# Patient Record
Sex: FEMALE | NOT HISPANIC OR LATINO | URBAN - METROPOLITAN AREA
[De-identification: names, ages, dates, MRNs, and addresses within clinical notes are randomized per-mention and may not be internally consistent; named-entity substitution may affect disease eponyms.]

---

## 2020-09-03 ENCOUNTER — OFFICE VISIT (OUTPATIENT)
Dept: FAMILY MEDICINE CLINIC | Facility: CLINIC | Age: 80
End: 2020-09-03

## 2020-09-03 VITALS
HEART RATE: 98 BPM | TEMPERATURE: 97.6 F | DIASTOLIC BLOOD PRESSURE: 60 MMHG | OXYGEN SATURATION: 98 % | RESPIRATION RATE: 16 BRPM | SYSTOLIC BLOOD PRESSURE: 120 MMHG | BODY MASS INDEX: 25.03 KG/M2 | HEIGHT: 62 IN | WEIGHT: 136 LBS

## 2020-09-03 DIAGNOSIS — E11.9 TYPE 2 DIABETES MELLITUS WITHOUT COMPLICATION, WITHOUT LONG-TERM CURRENT USE OF INSULIN (HCC): Primary | ICD-10-CM

## 2020-09-03 PROCEDURE — 99203 OFFICE O/P NEW LOW 30 MIN: CPT | Performed by: NURSE PRACTITIONER

## 2020-09-03 NOTE — PROGRESS NOTES
Subjective   Catarina Sherwood is a 79 y.o. female.     Catarina Sherwood 79 y.o. female who presents today for a new patient appointment.    she has a history of   Patient Active Problem List   Diagnosis   • Diabetes mellitus (CMS/[facility])   .  she is here to re-establish care I reviewed the PFSH recorded today by my MA/LPN staff.   she   She has been feeling well.    She has not seen a PCP since 2014.  She has hx of Type 2 DM but does not take any medications and has not for years.  She states she controls this with diet and exercise.  She does not check her glucose at home and has not had labs in years.    Ate oatmeal at 8 am.        She denies hx of HTN or hyperlipidemia. BP good today.        She is having dental work done soon and was told to see a PMD first.  She denies any complaints today.        She has not had influenza, pneumonia, Tdap or shingles vaccine.      She has not had eye exam in a few years but states she will schedule one.   The following portions of the patient's history were reviewed and updated as appropriate: allergies, current medications, past family history, past medical history, past social history, past surgical history and problem list.    Review of Systems   Constitutional: Negative for fatigue and unexpected weight change.   Eyes: Negative for visual disturbance.   Respiratory: Negative for shortness of breath.    Cardiovascular: Negative for chest pain and palpitations.   Endocrine: Negative for cold intolerance, heat intolerance, polydipsia, polyphagia and polyuria.   Neurological: Negative for dizziness, light-headedness and headaches.   Psychiatric/Behavioral: Negative for behavioral problems.       Objective   Physical Exam   Constitutional: She is oriented to person, place, and time. She appears well-developed and well-nourished.   Neck: Carotid bruit is not present.   Cardiovascular: Normal rate and regular rhythm.   Pulmonary/Chest: Effort normal and breath sounds normal.    Neurological: She is alert and oriented to person, place, and time.   Psychiatric: She has a normal mood and affect. Her behavior is normal. Judgment and thought content normal.   Nursing note and vitals reviewed.      Assessment/Plan   Catarina was seen today for establish care.    Diagnoses and all orders for this visit:    Type 2 diabetes mellitus without complication, without long-term current use of insulin (CMS/[facility])  -     Comprehensive metabolic panel  -     Lipid panel  -     CBC and Differential  -     TSH  -     Hemoglobin A1c  -     MicroAlbumin, Urine, Random - Urine, Clean Catch             labs today.  She will get Tdap and Shingrix at pharmacy.   She will plan to get Fluzone and pneumovax here when Fluzone is in. She will let us know if she gets these at pharmacy.

## 2020-09-04 LAB
ALBUMIN SERPL-MCNC: 4.7 G/DL (ref 3.5–5.2)
ALBUMIN/GLOB SERPL: 2 G/DL
ALP SERPL-CCNC: 74 U/L (ref 39–117)
ALT SERPL-CCNC: 12 U/L (ref 1–33)
AST SERPL-CCNC: 14 U/L (ref 1–32)
BASOPHILS # BLD AUTO: 0.04 10*3/MM3 (ref 0–0.2)
BASOPHILS NFR BLD AUTO: 0.5 % (ref 0–1.5)
BILIRUB SERPL-MCNC: 0.5 MG/DL (ref 0–1.2)
BUN SERPL-MCNC: 12 MG/DL (ref 8–23)
BUN/CREAT SERPL: 16.7 (ref 7–25)
CALCIUM SERPL-MCNC: 10.1 MG/DL (ref 8.6–10.5)
CHLORIDE SERPL-SCNC: 96 MMOL/L (ref 98–107)
CHOLEST SERPL-MCNC: 251 MG/DL (ref 0–200)
CO2 SERPL-SCNC: 25 MMOL/L (ref 22–29)
CREAT SERPL-MCNC: 0.72 MG/DL (ref 0.57–1)
EOSINOPHIL # BLD AUTO: 0.04 10*3/MM3 (ref 0–0.4)
EOSINOPHIL NFR BLD AUTO: 0.5 % (ref 0.3–6.2)
ERYTHROCYTE [DISTWIDTH] IN BLOOD BY AUTOMATED COUNT: 12.6 % (ref 12.3–15.4)
GLOBULIN SER CALC-MCNC: 2.4 GM/DL
GLUCOSE SERPL-MCNC: 288 MG/DL (ref 65–99)
HBA1C MFR BLD: 11 % (ref 4.8–5.6)
HCT VFR BLD AUTO: 45 % (ref 34–46.6)
HDLC SERPL-MCNC: 52 MG/DL (ref 40–60)
HGB BLD-MCNC: 14.8 G/DL (ref 12–15.9)
IMM GRANULOCYTES # BLD AUTO: 0.02 10*3/MM3 (ref 0–0.05)
IMM GRANULOCYTES NFR BLD AUTO: 0.3 % (ref 0–0.5)
LDLC SERPL CALC-MCNC: 153 MG/DL (ref 0–100)
LYMPHOCYTES # BLD AUTO: 2.32 10*3/MM3 (ref 0.7–3.1)
LYMPHOCYTES NFR BLD AUTO: 30.1 % (ref 19.6–45.3)
MCH RBC QN AUTO: 29.8 PG (ref 26.6–33)
MCHC RBC AUTO-ENTMCNC: 32.9 G/DL (ref 31.5–35.7)
MCV RBC AUTO: 90.5 FL (ref 79–97)
MICROALBUMIN UR-MCNC: 11 UG/ML
MONOCYTES # BLD AUTO: 0.63 10*3/MM3 (ref 0.1–0.9)
MONOCYTES NFR BLD AUTO: 8.2 % (ref 5–12)
NEUTROPHILS # BLD AUTO: 4.65 10*3/MM3 (ref 1.7–7)
NEUTROPHILS NFR BLD AUTO: 60.4 % (ref 42.7–76)
NRBC BLD AUTO-RTO: 0 /100 WBC (ref 0–0.2)
PLATELET # BLD AUTO: 305 10*3/MM3 (ref 140–450)
POTASSIUM SERPL-SCNC: 4.5 MMOL/L (ref 3.5–5.2)
PROT SERPL-MCNC: 7.1 G/DL (ref 6–8.5)
RBC # BLD AUTO: 4.97 10*6/MM3 (ref 3.77–5.28)
SODIUM SERPL-SCNC: 133 MMOL/L (ref 136–145)
TRIGL SERPL-MCNC: 230 MG/DL (ref 0–150)
TSH SERPL DL<=0.005 MIU/L-ACNC: 0.99 UIU/ML (ref 0.27–4.2)
VLDLC SERPL CALC-MCNC: 46 MG/DL
WBC # BLD AUTO: 7.7 10*3/MM3 (ref 3.4–10.8)

## 2020-09-25 ENCOUNTER — OFFICE VISIT (OUTPATIENT)
Dept: FAMILY MEDICINE CLINIC | Facility: CLINIC | Age: 80
End: 2020-09-25

## 2020-09-25 VITALS
DIASTOLIC BLOOD PRESSURE: 80 MMHG | TEMPERATURE: 97.1 F | BODY MASS INDEX: 24.84 KG/M2 | HEIGHT: 62 IN | HEART RATE: 93 BPM | SYSTOLIC BLOOD PRESSURE: 126 MMHG | OXYGEN SATURATION: 98 % | RESPIRATION RATE: 16 BRPM | WEIGHT: 135 LBS

## 2020-09-25 DIAGNOSIS — E11.9 TYPE 2 DIABETES MELLITUS WITHOUT COMPLICATION, WITHOUT LONG-TERM CURRENT USE OF INSULIN (HCC): ICD-10-CM

## 2020-09-25 DIAGNOSIS — Z00.00 ANNUAL PHYSICAL EXAM: Primary | ICD-10-CM

## 2020-09-25 PROCEDURE — 99214 OFFICE O/P EST MOD 30 MIN: CPT | Performed by: FAMILY MEDICINE

## 2020-09-25 PROCEDURE — 93000 ELECTROCARDIOGRAM COMPLETE: CPT | Performed by: FAMILY MEDICINE

## 2020-09-25 RX ORDER — GLIMEPIRIDE 1 MG/1
1 TABLET ORAL
Qty: 30 TABLET | Refills: 2 | Status: SHIPPED | OUTPATIENT
Start: 2020-09-25 | End: 2020-12-31

## 2020-09-25 NOTE — PROGRESS NOTES
"Subjective   Chief Complaint:   Chief Complaint   Patient presents with   • Annual Exam         History of Present Illness Here for CPE. She is scheduled to have dental surgery per Dr. Bautista mendez. We reviewed her labs in detail: glucose 288, hemoglobin A1c 11. I am going to start her on Amaryl 1mg BID. I am also referring her to Diabetic Education at [facility]. I will recheck in two weeks, she is going to keep track of her sugars and bring them in. She is not yet cleared for surgery at this time. EKG is NSR with a rate of 88. No prior tracing for comparison. No chest pain, exertional dyspnea or palpitations.        Past Medical History:   Diagnosis Date   • Diabetes mellitus (CMS/HCC)         Catarina Sherwood 79 y.o. female who presents today for an Annual Physical Exam.      ICD-10-CM ICD-9-CM   1. Annual physical exam  Z00.00 V70.0   2. Type 2 diabetes mellitus without complication, without long-term current use of insulin (CMS/HCC)  E11.9 250.00        she has a problem list of   Patient Active Problem List   Diagnosis   • Diabetes mellitus (CMS/HCC)   .    she has been compliant with   Current Outpatient Medications:   •  glimepiride (Amaryl) 1 MG tablet, Take 1 tablet by mouth Every Morning Before Breakfast for 30 days., Disp: 30 tablet, Rfl: 2.  she denies medication side effects.        /80   Pulse 93   Temp 97.1 °F (36.2 °C)   Resp 16   Ht 157.5 cm (62\")   Wt 61.2 kg (135 lb)   SpO2 98%   BMI 24.69 kg/m²     Results for orders placed or performed in visit on 09/03/20   Comprehensive metabolic panel    Specimen: Blood   Result Value Ref Range    Glucose 288 (H) 65 - 99 mg/dL    BUN 12 8 - 23 mg/dL    Creatinine 0.72 0.57 - 1.00 mg/dL    eGFR Non African Am 79 >60 mL/min/1.73    eGFR African Am 95 >60 mL/min/1.73    BUN/Creatinine Ratio 16.7 7.0 - 25.0    Sodium 133 (L) 136 - 145 mmol/L    Potassium 4.5 3.5 - 5.2 mmol/L    Chloride 96 (L) 98 - 107 mmol/L    Total CO2 25.0 22.0 - 29.0 mmol/L    " Calcium 10.1 8.6 - 10.5 mg/dL    Total Protein 7.1 6.0 - 8.5 g/dL    Albumin 4.70 3.50 - 5.20 g/dL    Globulin 2.4 gm/dL    A/G Ratio 2.0 g/dL    Total Bilirubin 0.5 0.0 - 1.2 mg/dL    Alkaline Phosphatase 74 39 - 117 U/L    AST (SGOT) 14 1 - 32 U/L    ALT (SGPT) 12 1 - 33 U/L   Lipid panel    Specimen: Blood   Result Value Ref Range    Total Cholesterol 251 (H) 0 - 200 mg/dL    Triglycerides 230 (H) 0 - 150 mg/dL    HDL Cholesterol 52 40 - 60 mg/dL    VLDL Cholesterol [place] 46 mg/dL    LDL Chol Calc (NIH) 153 (H) 0 - 100 mg/dL   TSH    Specimen: Blood   Result Value Ref Range    TSH 0.991 0.270 - 4.200 uIU/mL   Hemoglobin A1c    Specimen: Blood   Result Value Ref Range    Hemoglobin A1C 11.00 (H) 4.80 - 5.60 %   MicroAlbumin, Urine, Random - Urine, Clean Catch    Specimen: Urine, Clean Catch   Result Value Ref Range    Microalbumin, Urine 11.0 Not Estab. ug/mL   CBC and Differential    Specimen: Blood   Result Value Ref Range    WBC 7.70 3.40 - 10.80 10*3/mm3    RBC 4.97 3.77 - 5.28 10*6/mm3    Hemoglobin 14.8 12.0 - 15.9 g/dL    Hematocrit 45.0 34.0 - 46.6 %    MCV 90.5 79.0 - 97.0 fL    MCH 29.8 26.6 - 33.0 pg    MCHC 32.9 31.5 - 35.7 g/dL    RDW 12.6 12.3 - 15.4 %    Platelets 305 140 - 450 10*3/mm3    Neutrophil Rel % 60.4 42.7 - 76.0 %    Lymphocyte Rel % 30.1 19.6 - 45.3 %    Monocyte Rel % 8.2 5.0 - 12.0 %    Eosinophil Rel % 0.5 0.3 - 6.2 %    Basophil Rel % 0.5 0.0 - 1.5 %    Neutrophils Absolute 4.65 1.70 - 7.00 10*3/mm3    Lymphocytes Absolute 2.32 0.70 - 3.10 10*3/mm3    Monocytes Absolute 0.63 0.10 - 0.90 10*3/mm3    Eosinophils Absolute 0.04 0.00 - 0.40 10*3/mm3    Basophils Absolute 0.04 0.00 - 0.20 10*3/mm3    Immature Granulocyte Rel % 0.3 0.0 - 0.5 %    Immature Grans Absolute 0.02 0.00 - 0.05 10*3/mm3    nRBC 0.0 0.0 - 0.2 /100 WBC       The following portions of the patient's history were reviewed and updated as appropriate: allergies, current medications, past family history, past medical  history, past social history, past surgical history and problem list.      she has a history of   Patient Active Problem List   Diagnosis   • Diabetes mellitus (CMS/[facility])       Review of Systems   Constitutional: Negative for activity change, appetite change and unexpected weight change.   HENT: Positive for dental problem.    Eyes: Negative for visual disturbance.   Respiratory: Negative for chest tightness and shortness of breath.    Cardiovascular: Negative for chest pain and palpitations.   Skin: Negative for color change.   Neurological: Negative for syncope and speech difficulty.   Psychiatric/Behavioral: Negative for confusion and decreased concentration.   All other systems reviewed and are negative.      Objective   Physical Exam  Vitals signs and nursing note reviewed.   Constitutional:       Appearance: She is well-developed.   HENT:      Head: Normocephalic and atraumatic.      Right Ear: External ear normal.      Left Ear: External ear normal.      Nose: Nose normal.      Mouth/Throat:      Mouth: Mucous membranes are moist.      Dentition: Abnormal dentition. Does not have dentures. Dental caries present. No dental abscesses.   Eyes:      Pupils: Pupils are equal, round, and reactive to light.   Cardiovascular:      Rate and Rhythm: Normal rate and regular rhythm.   Pulmonary:      Effort: Pulmonary effort is normal.      Breath sounds: Normal breath sounds.   Abdominal:      General: Bowel sounds are normal.      Palpations: Abdomen is soft.   Neurological:      Mental Status: She is alert and oriented to person, place, and time.   Psychiatric:         Behavior: Behavior normal.         Assessment/Plan   Catarina was seen today for annual exam.    Diagnoses and all orders for this visit:    Annual physical exam  -     ECG 12 Lead    Type 2 diabetes mellitus without complication, without long-term current use of insulin (CMS/[facility])  -     Ambulatory Referral to Diabetic Education    Other orders  -      glimepiride (Amaryl) 1 MG tablet; Take 1 tablet by mouth Every Morning Before Breakfast for 30 days.      Labs results discussed in detail with the patient, if no recent labs were done, order placed today.  Plan to update vaccines if needed today.  I  reviewed health maintenance with the patient as part of my preventative care plan. I discussed preventative counseling with the patient in detail.

## 2020-09-25 NOTE — PROGRESS NOTES
Procedure     ECG 12 Lead    Date/Time: 9/25/2020 10:18 AM  Performed by: Jake Diane MD  Authorized by: Jake Diane MD   Comparison: not compared with previous ECG   Previous ECG: no previous ECG available  Rhythm: sinus rhythm  Rate: normal  BPM: 87  ST Segments: ST segments normal  T Waves: T waves normal  QRS axis: normal  Other: no other findings    Clinical impression: normal ECG

## 2020-11-04 ENCOUNTER — OFFICE VISIT (OUTPATIENT)
Dept: FAMILY MEDICINE CLINIC | Facility: CLINIC | Age: 80
End: 2020-11-04

## 2020-11-04 VITALS
TEMPERATURE: 97.3 F | OXYGEN SATURATION: 99 % | DIASTOLIC BLOOD PRESSURE: 80 MMHG | WEIGHT: 137 LBS | RESPIRATION RATE: 16 BRPM | HEIGHT: 62 IN | SYSTOLIC BLOOD PRESSURE: 133 MMHG | HEART RATE: 92 BPM | BODY MASS INDEX: 25.21 KG/M2

## 2020-11-04 DIAGNOSIS — E11.65 TYPE 2 DIABETES MELLITUS WITH HYPERGLYCEMIA, WITHOUT LONG-TERM CURRENT USE OF INSULIN (HCC): Primary | ICD-10-CM

## 2020-11-04 PROCEDURE — 99214 OFFICE O/P EST MOD 30 MIN: CPT | Performed by: FAMILY MEDICINE

## 2020-11-04 NOTE — PROGRESS NOTES
Subjective   Chief Complaint:   Chief Complaint   Patient presents with   • Surgery clearance     pt is having 5 teeth pulled and she needs a note from doctor.          History of Present Illness comes in today with complaints of teeth problems and teeth broken off and some dental caries so she is seen a dentist and she is going to have oral surgery and have these teeth removed but she wants her the dentist wants a clearance from me for her to have the surgery but I looked at her labs in 2 months ago she had labs in September 2020 and her blood sugar was 288 and her cholesterol was 251 her hemoglobin was 14 8 and her hemoglobin A1c was 11 she saw Bren Lanza but she never saw the endocrinologist that Bren referred her to so she is needs to see Dr. Rodriguez before she has this dental procedure since her sugar is at high and I do not know what her sugars are at home she is not had a glucometer yet to check her sugars and I am to give her a prescription to get that.  So wanted to see Dr. Michael adair before any kind of surgical procedure and this was far January 2021 originally but I want her to see Dr. Michael adair somata put a hold on the surgery for her to see Dr. Michael adair she is not on any medicines for diabetes and she will need to see Dr. Rodriguez are can she can see Natalie adair severe tooth decay in her mouth and she needs to see the dentist to get this taken care of but she is got a see Dr. Minnie Graves or Natalie Martínez to get her blood sugar taken care of        Past Medical History:   Diagnosis Date   • Diabetes mellitus (CMS/[facility])         Catarina Sherwood 80 y.o. female who presents today for Medical Management of the below listed issues and medication refills.    ICD-10-CM ICD-9-CM   1. Type 2 diabetes mellitus with hyperglycemia, without long-term current use of insulin (CMS/HCC)  E11.65 250.00     790.29        she has a problem list of   Patient Active Problem List   Diagnosis   • Diabetes mellitus  "(CMS/[facility])   .    she has been compliant with   Current Outpatient Medications:   •  glimepiride (Amaryl) 1 MG tablet, Take 1 tablet by mouth Every Morning Before Breakfast for 30 days., Disp: 30 tablet, Rfl: 2.  she denies medication side effects.        /80   Pulse 92   Temp 97.3 °F (36.3 °C) (Skin)   Resp 16   Ht 157.5 cm (62\")   Wt 62.1 kg (137 lb)   SpO2 99%   BMI 25.06 kg/m²         The following portions of the patient's history were reviewed and updated as appropriate: allergies, current medications, past family history, past medical history, past social history, past surgical history and problem list.      she has a history of   Patient Active Problem List   Diagnosis   • Diabetes mellitus (CMS/[facility])       Review of Systems   Constitutional: Negative for activity change and fatigue.   HENT: Positive for mouth sores. Negative for nosebleeds.         Has several dental caries and broken off teeth and tooth decay and needs to see a dentist but she is got a see the endocrinologist first   Eyes: Negative for redness.   Respiratory: Negative for chest tightness.    Cardiovascular: Negative for chest pain.   Gastrointestinal: Negative for abdominal pain.   Genitourinary: Negative for difficulty urinating, frequency and urgency.   Musculoskeletal: Negative for back pain.   Neurological: Negative for facial asymmetry and headaches.   Psychiatric/Behavioral: Negative for confusion. The patient is not nervous/anxious.        Objective   Physical Exam  HENT:      Right Ear: Tympanic membrane and ear canal normal.      Left Ear: Tympanic membrane and ear canal normal.      Mouth/Throat:      Mouth: Mucous membranes are moist.      Pharynx: Oropharynx is clear.      Comments: Got several broken off teeth and several dental caries and needs this taken care of by dentist but she needs to see the endocrinologist or Natalie Martínez first  Eyes:      Pupils: Pupils are equal, round, and reactive to light.   Neck:     "  Musculoskeletal: Normal range of motion.   Cardiovascular:      Rate and Rhythm: Normal rate.   Pulmonary:      Effort: Pulmonary effort is normal.      Breath sounds: Normal breath sounds.   Abdominal:      General: Bowel sounds are normal.   Musculoskeletal: Normal range of motion.   Skin:     General: Skin is warm.      Findings: No rash.   Neurological:      General: No focal deficit present.      Mental Status: She is alert.   Psychiatric:         Mood and Affect: Mood normal.         Behavior: Behavior normal.         Thought Content: Thought content normal.         Judgment: Judgment normal.         Assessment/Plan   Diagnoses and all orders for this visit:    1. Type 2 diabetes mellitus with hyperglycemia, without long-term current use of insulin (CMS/[facility]) (Primary)  -     Continuous Blood Gluc  (FreeStyle Arpita Mora) device; 1 Device Daily. Check BS once daily DX E11.9  Dispense: 1 Device; Refill: 0      Labs results discussed in detail with the patient, if no recent labs were done, order placed today.  Plan to update vaccines if needed today.  I  reviewed health maintenance with the patient as part of my preventative care plan. I discussed preventative counseling with the patient in detail.

## 2020-11-05 RX ORDER — FLASH GLUCOSE SENSOR
1 KIT MISCELLANEOUS DAILY
Qty: 1 DEVICE | Refills: 0 | Status: SHIPPED | OUTPATIENT
Start: 2020-11-05 | End: 2021-05-13

## 2020-12-31 RX ORDER — GLIMEPIRIDE 1 MG/1
TABLET ORAL
Qty: 90 TABLET | Refills: 0 | Status: SHIPPED | OUTPATIENT
Start: 2020-12-31 | End: 2021-01-19 | Stop reason: SDUPTHER

## 2021-01-19 ENCOUNTER — OFFICE VISIT (OUTPATIENT)
Dept: ENDOCRINOLOGY | Age: 81
End: 2021-01-19

## 2021-01-19 VITALS
OXYGEN SATURATION: 96 % | DIASTOLIC BLOOD PRESSURE: 68 MMHG | SYSTOLIC BLOOD PRESSURE: 130 MMHG | BODY MASS INDEX: 25.1 KG/M2 | HEART RATE: 97 BPM | WEIGHT: 136.4 LBS | HEIGHT: 62 IN

## 2021-01-19 DIAGNOSIS — E78.2 HYPERLIPEMIA, MIXED: ICD-10-CM

## 2021-01-19 DIAGNOSIS — IMO0002 SEVERE UNCONTROLLED DIABETES MELLITUS: ICD-10-CM

## 2021-01-19 DIAGNOSIS — E11.65 TYPE 2 DIABETES MELLITUS WITH HYPERGLYCEMIA, WITHOUT LONG-TERM CURRENT USE OF INSULIN (HCC): Primary | Chronic | ICD-10-CM

## 2021-01-19 PROCEDURE — 99204 OFFICE O/P NEW MOD 45 MIN: CPT | Performed by: INTERNAL MEDICINE

## 2021-01-19 RX ORDER — FLASH GLUCOSE SCANNING READER
1 EACH MISCELLANEOUS 4 TIMES DAILY
Qty: 1 DEVICE | Refills: 0 | Status: SHIPPED | OUTPATIENT
Start: 2021-01-19 | End: 2021-05-13

## 2021-01-19 RX ORDER — FLASH GLUCOSE SENSOR
1 KIT MISCELLANEOUS AS NEEDED
Qty: 2 EACH | Refills: 3 | Status: SHIPPED | OUTPATIENT
Start: 2021-01-19 | End: 2021-05-13

## 2021-01-19 RX ORDER — LANCETS
EACH MISCELLANEOUS
Qty: 100 EACH | Refills: 2 | Status: SHIPPED | OUTPATIENT
Start: 2021-01-19

## 2021-01-19 RX ORDER — GLIMEPIRIDE 2 MG/1
2 TABLET ORAL
Qty: 180 TABLET | Refills: 3 | Status: SHIPPED | OUTPATIENT
Start: 2021-01-19 | End: 2021-05-13

## 2021-01-19 RX ORDER — BLOOD-GLUCOSE METER
EACH MISCELLANEOUS
Qty: 1 KIT | Refills: 0 | Status: SHIPPED | OUTPATIENT
Start: 2021-01-19

## 2021-01-19 RX ORDER — ROSUVASTATIN CALCIUM 5 MG/1
5 TABLET, COATED ORAL NIGHTLY
Qty: 30 TABLET | Refills: 11 | Status: SHIPPED | OUTPATIENT
Start: 2021-01-19 | End: 2021-05-13

## 2021-01-19 RX ORDER — GLIMEPIRIDE 1 MG/1
2 TABLET ORAL
Qty: 90 TABLET | Refills: 0 | Status: SHIPPED | OUTPATIENT
Start: 2021-01-19 | End: 2021-01-19

## 2021-01-19 RX ORDER — BLOOD SUGAR DIAGNOSTIC
STRIP MISCELLANEOUS
Qty: 100 EACH | Refills: 2 | Status: SHIPPED | OUTPATIENT
Start: 2021-01-19

## 2021-01-19 NOTE — PROGRESS NOTES
"Chief Complaint  Chief Complaint   Patient presents with   • Diabetes       NEW PATIENT/ TYPE 2 DM    Subjective            History of Present Illness  Catarina Sherwood,80 y.o. is here as a new patient  for the evaluation of type 2 dm. Consulted by .     Type 2 dm - Diagnosed in 2012  Today in clinic pt reports being on Glimepiride 1 mg po daily with break fast.  FBG - doesn't check  Pre meals - doesn't check  Checks BG - doesn't check  Dm retinopathy - no,Last eye exam - due for exam  Dm nephropathy - no  Dm neuropathy - no,Dm neuropathy meds - no  CAD -no  CVA -no  Episodes of hypoglycemia - none that she knows  Pt is physically active. weight has been stable.   Pt tries to follow DM diet for most part.   On Ace inb.    Reviewed primary care physician's/consulting physician documentation and lab results     I have reviewed the patient's allergies, medicines, past medical hx, family hx and social hx in detail.    Objective   Vital Signs:   /68   Pulse 97   Ht 157.5 cm (62\")   Wt 61.9 kg (136 lb 6.4 oz)   SpO2 96%   BMI 24.95 kg/m²     Physical Exam   General appearance - no distress  Eyes- anicteric sclera  Ear nose and throat-external ears and nose normal.    Respiratory-normal chest on inspection.  No respiratory distress noted.  Musculoskeletal-no edema.    Skin-no rashes.  Neuro-alert and oriented x3            Result Review :   The following data was reviewed by: Torres Rodriguez MD on 01/19/2021:  Office Visit on 09/03/2020   Component Date Value Ref Range Status   • Glucose 09/03/2020 288* 65 - 99 mg/dL Final   • BUN 09/03/2020 12  8 - 23 mg/dL Final   • Creatinine 09/03/2020 0.72  0.57 - 1.00 mg/dL Final   • eGFR Non  Am 09/03/2020 79  >60 mL/min/1.73 Final    Comment: The MDRD GFR formula is only valid for adults with stable  renal function between ages 18 and 70.     • eGFR  Am 09/03/2020 95  >60 mL/min/1.73 Final   • BUN/Creatinine Ratio 09/03/2020 16.7  7.0 - 25.0 Final "   • Sodium 09/03/2020 133* 136 - 145 mmol/L Final   • Potassium 09/03/2020 4.5  3.5 - 5.2 mmol/L Final   • Chloride 09/03/2020 96* 98 - 107 mmol/L Final   • Total CO2 09/03/2020 25.0  22.0 - 29.0 mmol/L Final   • Calcium 09/03/2020 10.1  8.6 - 10.5 mg/dL Final   • Total Protein 09/03/2020 7.1  6.0 - 8.5 g/dL Final   • Albumin 09/03/2020 4.70  3.50 - 5.20 g/dL Final   • Globulin 09/03/2020 2.4  gm/dL Final   • A/G Ratio 09/03/2020 2.0  g/dL Final   • Total Bilirubin 09/03/2020 0.5  0.0 - 1.2 mg/dL Final   • Alkaline Phosphatase 09/03/2020 74  39 - 117 U/L Final   • AST (SGOT) 09/03/2020 14  1 - 32 U/L Final   • ALT (SGPT) 09/03/2020 12  1 - 33 U/L Final   • Total Cholesterol 09/03/2020 251* 0 - 200 mg/dL Final   • Triglycerides 09/03/2020 230* 0 - 150 mg/dL Final   • HDL Cholesterol 09/03/2020 52  40 - 60 mg/dL Final   • VLDL Cholesterol [place] 09/03/2020 46  mg/dL Final   • LDL Chol Calc ([facility]) 09/03/2020 153* 0 - 100 mg/dL Final   • WBC 09/03/2020 7.70  3.40 - 10.80 10*3/mm3 Final   • RBC 09/03/2020 4.97  3.77 - 5.28 10*6/mm3 Final   • Hemoglobin 09/03/2020 14.8  12.0 - 15.9 g/dL Final   • Hematocrit 09/03/2020 45.0  34.0 - 46.6 % Final   • MCV 09/03/2020 90.5  79.0 - 97.0 fL Final   • MCH 09/03/2020 29.8  26.6 - 33.0 pg Final   • MCHC 09/03/2020 32.9  31.5 - 35.7 g/dL Final   • RDW 09/03/2020 12.6  12.3 - 15.4 % Final   • Platelets 09/03/2020 305  140 - 450 10*3/mm3 Final   • Neutrophil Rel % 09/03/2020 60.4  42.7 - 76.0 % Final   • Lymphocyte Rel % 09/03/2020 30.1  19.6 - 45.3 % Final   • Monocyte Rel % 09/03/2020 8.2  5.0 - 12.0 % Final   • Eosinophil Rel % 09/03/2020 0.5  0.3 - 6.2 % Final   • Basophil Rel % 09/03/2020 0.5  0.0 - 1.5 % Final   • Neutrophils Absolute 09/03/2020 4.65  1.70 - 7.00 10*3/mm3 Final   • Lymphocytes Absolute 09/03/2020 2.32  0.70 - 3.10 10*3/mm3 Final   • Monocytes Absolute 09/03/2020 0.63  0.10 - 0.90 10*3/mm3 Final   • Eosinophils Absolute 09/03/2020 0.04  0.00 - 0.40 10*3/mm3 Final   •  Basophils Absolute 09/03/2020 0.04  0.00 - 0.20 10*3/mm3 Final   • Immature Granulocyte Rel % 09/03/2020 0.3  0.0 - 0.5 % Final   • Immature Grans Absolute 09/03/2020 0.02  0.00 - 0.05 10*3/mm3 Final   • nRBC 09/03/2020 0.0  0.0 - 0.2 /100 WBC Final   • TSH 09/03/2020 0.991  0.270 - 4.200 uIU/mL Final   • Hemoglobin A1C 09/03/2020 11.00* 4.80 - 5.60 % Final    Comment: Hemoglobin A1C Ranges:  Increased Risk for Diabetes  5.7% to 6.4%  Diabetes                     >= 6.5%  Diabetic Goal                < 7.0%     • Microalbumin, Urine 09/03/2020 11.0  Not Estab. ug/mL Final     Data reviewed: PCP documentation        I reviewed the patient's new clinical results and mentioned them above in HPI and in plan as well.          Assessment and Plan    Problem List Items Addressed This Visit        Other    Diabetes mellitus (CMS/[facility]) - Primary    Relevant Medications    metFORMIN (Glucophage) 1000 MG tablet    glimepiride (AMARYL) 2 MG tablet    Other Relevant Orders    Hemoglobin A1c    Basic Metabolic Panel    Hemoglobin A1c    Basic Metabolic Panel    Lipid Panel    TSH    T4, Free    Microalbumin / Creatinine Urine Ratio - Urine, Clean Catch      Other Visit Diagnoses     Hyperlipemia, mixed        Relevant Medications    rosuvastatin (Crestor) 5 MG tablet    Other Relevant Orders    Hemoglobin A1c    Basic Metabolic Panel    Hemoglobin A1c    Basic Metabolic Panel    Lipid Panel    TSH    T4, Free    Microalbumin / Creatinine Urine Ratio - Urine, Clean Catch    Severe uncontrolled diabetes mellitus (CMS/[facility])        Relevant Medications    metFORMIN (Glucophage) 1000 MG tablet    glimepiride (AMARYL) 2 MG tablet    Other Relevant Orders    Hemoglobin A1c    Basic Metabolic Panel    Hemoglobin A1c    Basic Metabolic Panel    Lipid Panel    TSH    T4, Free    Microalbumin / Creatinine Urine Ratio - Urine, Clean Catch        Type 2 diabetes mellitus-severely uncontrolled  Patient is at high risk for diabetic complications  and hospitalizations  Start glimepiride 2 mg twice daily with meals  Start metformin 1000 mg twice daily    Hyperlipidemia  Start Crestor 5 mg oral daily.  Next para discussed the benefits and the side effects of the medication    We will place continuous glucose monitoring on the patient after we get the devices.  We will call the patient and let her know about it    I explained to the patient the importance of checking her blood sugars.      Follow Up   No follow-ups on file.    Refills/Meds sent to pharmacy    Interpreted the blood work-up/imaging results performed by the primary care/consulting physician -    Patient was given instructions and counseling regarding her condition or for health maintenance advice. Please see specific information pulled into the AVS if appropriate.

## 2021-01-20 LAB
BUN SERPL-MCNC: 10 MG/DL (ref 8–23)
BUN/CREAT SERPL: 15.4 (ref 7–25)
CALCIUM SERPL-MCNC: 10.8 MG/DL (ref 8.6–10.5)
CHLORIDE SERPL-SCNC: 101 MMOL/L (ref 98–107)
CO2 SERPL-SCNC: 30.1 MMOL/L (ref 22–29)
CREAT SERPL-MCNC: 0.65 MG/DL (ref 0.57–1)
GLUCOSE SERPL-MCNC: 130 MG/DL (ref 65–99)
HBA1C MFR BLD: 7.8 % (ref 4.8–5.6)
POTASSIUM SERPL-SCNC: 4.6 MMOL/L (ref 3.5–5.2)
SODIUM SERPL-SCNC: 140 MMOL/L (ref 136–145)

## 2021-01-21 RX ORDER — GLIMEPIRIDE 1 MG/1
TABLET ORAL
Qty: 360 TABLET | Refills: 1 | OUTPATIENT
Start: 2021-01-21

## 2021-04-30 LAB
ALBUMIN/CREAT UR: 44 MG/G CREAT (ref 0–29)
BUN SERPL-MCNC: 12 MG/DL (ref 8–23)
BUN/CREAT SERPL: 19.7 (ref 7–25)
CALCIUM SERPL-MCNC: 10.7 MG/DL (ref 8.6–10.5)
CHLORIDE SERPL-SCNC: 99 MMOL/L (ref 98–107)
CHOLEST SERPL-MCNC: 246 MG/DL (ref 0–200)
CO2 SERPL-SCNC: 31.9 MMOL/L (ref 22–29)
CREAT SERPL-MCNC: 0.61 MG/DL (ref 0.57–1)
CREAT UR-MCNC: 46.3 MG/DL
GLUCOSE SERPL-MCNC: 194 MG/DL (ref 65–99)
HBA1C MFR BLD: 9.2 % (ref 4.8–5.6)
HDLC SERPL-MCNC: 50 MG/DL (ref 40–60)
IMP & REVIEW OF LAB RESULTS: NORMAL
LDLC SERPL CALC-MCNC: 160 MG/DL (ref 0–100)
MICROALBUMIN UR-MCNC: 20.3 UG/ML
POTASSIUM SERPL-SCNC: 4.5 MMOL/L (ref 3.5–5.2)
SODIUM SERPL-SCNC: 139 MMOL/L (ref 136–145)
T4 FREE SERPL-MCNC: 1.23 NG/DL (ref 0.93–1.7)
TRIGL SERPL-MCNC: 196 MG/DL (ref 0–150)
TSH SERPL DL<=0.005 MIU/L-ACNC: 1.01 UIU/ML (ref 0.27–4.2)
VLDLC SERPL CALC-MCNC: 36 MG/DL (ref 5–40)

## 2021-05-13 ENCOUNTER — OFFICE VISIT (OUTPATIENT)
Dept: ENDOCRINOLOGY | Age: 81
End: 2021-05-13

## 2021-05-13 VITALS
OXYGEN SATURATION: 99 % | HEART RATE: 84 BPM | SYSTOLIC BLOOD PRESSURE: 156 MMHG | WEIGHT: 129.6 LBS | BODY MASS INDEX: 23.85 KG/M2 | HEIGHT: 62 IN | DIASTOLIC BLOOD PRESSURE: 76 MMHG

## 2021-05-13 DIAGNOSIS — E78.2 HYPERLIPEMIA, MIXED: ICD-10-CM

## 2021-05-13 DIAGNOSIS — E11.65 TYPE 2 DIABETES MELLITUS WITH HYPERGLYCEMIA, WITHOUT LONG-TERM CURRENT USE OF INSULIN (HCC): Primary | ICD-10-CM

## 2021-05-13 PROCEDURE — 99214 OFFICE O/P EST MOD 30 MIN: CPT | Performed by: INTERNAL MEDICINE

## 2021-05-13 RX ORDER — IBUPROFEN 600 MG/1
600 TABLET ORAL
COMMUNITY
Start: 2021-03-11

## 2021-05-13 RX ORDER — METFORMIN HYDROCHLORIDE 500 MG/1
500 TABLET, EXTENDED RELEASE ORAL 2 TIMES DAILY WITH MEALS
Qty: 60 TABLET | Refills: 11 | Status: SHIPPED | OUTPATIENT
Start: 2021-05-13 | End: 2022-12-23 | Stop reason: SDUPTHER

## 2021-05-13 RX ORDER — CHLORHEXIDINE GLUCONATE 0.12 MG/ML
RINSE ORAL
COMMUNITY
Start: 2021-03-11 | End: 2021-05-13

## 2021-05-13 RX ORDER — GLIMEPIRIDE 2 MG/1
2 TABLET ORAL 2 TIMES DAILY WITH MEALS
Qty: 30 TABLET | Refills: 11 | Status: SHIPPED | OUTPATIENT
Start: 2021-05-13 | End: 2022-07-19

## 2021-05-13 RX ORDER — DIPHENHYDRAMINE HCL 25 MG
25 CAPSULE ORAL NIGHTLY PRN
COMMUNITY
End: 2021-09-29

## 2021-05-13 RX ORDER — HYDROCODONE BITARTRATE AND ACETAMINOPHEN 5; 325 MG/1; MG/1
TABLET ORAL SEE ADMIN INSTRUCTIONS
COMMUNITY
Start: 2021-03-11 | End: 2021-05-13

## 2021-05-13 NOTE — PROGRESS NOTES
"Chief Complaint  Chief Complaint   Patient presents with   • Diabetes     pt not testing bs / last dm eye exam over 2 yrs ago        Subjective          History of Present Illness    Catarina Sherwood 80 y.o. presents with Type 2 dm as a F/u patient.     Type 2 dm - Diagnosed in 2012.  Today in clinic pt reports being on no medications, stopped all of them and is working on diet now.   FBG - doesn't check  Pre meals - doesn't check  Checks BG -doesn't check   Sensor - x   Dm retinopathy - x,Last eye exam - due for exam  Dm nephropathy - x  Dm neuropathy - yes ,Dm neuropathy meds - no  CAD - x  CVA -x  Episodes of hypoglycemia - none  Pt is physically active. weight has been stable.   Pt tries to follow DM diet for most part.         Reviewed primary care physician's/consulting physician documentation and lab results         I have reviewed the patient's allergies, medicines, past medical hx, family hx and social hx in detail.    Objective   Vital Signs:   /76 (BP Location: Right arm, Patient Position: Sitting, Cuff Size: Large Adult)   Pulse 84   Ht 157.5 cm (62.01\")   Wt 58.8 kg (129 lb 9.6 oz)   SpO2 99%   BMI 23.70 kg/m²   Physical Exam   General appearance - no distress  Eyes- anicteric sclera  Ear nose and throat-external ears and nose normal.    Respiratory-normal chest on inspection.  No respiratory distress noted.  Skin-no rashes.  Neuro-alert and oriented x3            Result Review :   The following data was reviewed by: Torres Rodriguez MD on 05/13/2021:  Orders Only on 04/29/2021   Component Date Value Ref Range Status   • Glucose 04/29/2021 194* 65 - 99 mg/dL Final   • BUN 04/29/2021 12  8 - 23 mg/dL Final   • Creatinine 04/29/2021 0.61  0.57 - 1.00 mg/dL Final   • eGFR Non African Am 04/29/2021 95  >60 mL/min/1.73 Final    Comment: The MDRD GFR formula is only valid for adults with stable  renal function between ages 18 and 70.     • eGFR  Am 04/29/2021 115  >60 mL/min/1.73 Final   • " BUN/Creatinine Ratio 04/29/2021 19.7  7.0 - 25.0 Final   • Sodium 04/29/2021 139  136 - 145 mmol/L Final   • Potassium 04/29/2021 4.5  3.5 - 5.2 mmol/L Final   • Chloride 04/29/2021 99  98 - 107 mmol/L Final   • Total CO2 04/29/2021 31.9* 22.0 - 29.0 mmol/L Final   • Calcium 04/29/2021 10.7* 8.6 - 10.5 mg/dL Final   • Total Cholesterol 04/29/2021 246* 0 - 200 mg/dL Final    Comment: Cholesterol Reference Ranges  (U.S. Department of Health and Human Services ATP III  Classifications)  Desirable          <200 mg/dL  Borderline High    200-239 mg/dL  High Risk          >240 mg/dL  Triglyceride Reference Ranges  (U.S. Department of Health and Human Services ATP III  Classifications)  Normal           <150 mg/dL  Borderline High  150-199 mg/dL  High             200-499 mg/dL  Very High        >500 mg/dL  HDL Reference Ranges  (U.S. Department of Health and Human Services ATP III  Classifcations)  Low     <40 mg/dl (major risk factor for CHD)  High    >60 mg/dl ('negative' risk factor for CHD)  LDL Reference Ranges  (U.S. Department of Health and Human Services ATP III  Classifcations)  Optimal          <100 mg/dL  Near Optimal     100-129 mg/dL  Borderline High  130-159 mg/dL  High             160-189 mg/dL  Very High        >189 mg/dL     • Triglycerides 04/29/2021 196* 0 - 150 mg/dL Final   • HDL Cholesterol 04/29/2021 50  40 - 60 mg/dL Final   • VLDL Cholesterol Tristian 04/29/2021 36  5 - 40 mg/dL Final   • LDL Chol Calc ([facility]) 04/29/2021 160* 0 - 100 mg/dL Final   • Creatinine, Urine 04/29/2021 46.3  Not Estab. mg/dL Final   • Microalbumin, Urine 04/29/2021 20.3  Not Estab. ug/mL Final   • Microalbumin/Creatinine Ratio 04/29/2021 44* 0 - 29 mg/g creat Final    Comment:                        Normal:                0 -  29                         Moderately increased: 30 - 300                         Severely increased:       >300     • Hemoglobin A1C 04/29/2021 9.20* 4.80 - 5.60 % Final    Comment: Hemoglobin A1C  Ranges:  Increased Risk for Diabetes  5.7% to 6.4%  Diabetes                     >= 6.5%  Diabetic Goal                < 7.0%     • Free T4 04/29/2021 1.23  0.93 - 1.70 ng/dL Final    Results may be falsely increased if patient taking Biotin.   • TSH 04/29/2021 1.010  0.270 - 4.200 uIU/mL Final   • Interpretation 04/29/2021 Note   Final    Supplemental report is available.     Data reviewed: PCP documentation       Results Review:    I reviewed the patient's new clinical results.     Assessment and Plan    Problem List Items Addressed This Visit        Other    Diabetes mellitus (CMS/[facility]) - Primary    Relevant Medications    metFORMIN ER (GLUCOPHAGE-XR) 500 MG 24 hr tablet    glimepiride (Amaryl) 2 MG tablet    Other Relevant Orders    Basic Metabolic Panel    Hemoglobin A1c    Lipid Panel    Microalbumin / Creatinine Urine Ratio - Urine, Clean Catch    TSH    T4, Free      Other Visit Diagnoses     Hyperlipemia, mixed        Relevant Orders    Basic Metabolic Panel    Hemoglobin A1c    Lipid Panel    Microalbumin / Creatinine Urine Ratio - Urine, Clean Catch    TSH    T4, Free        Type 2 diabetes mellitus-uncontrolled with hyperglycemia  Emphasized to the patient importance of compliance to her medications  Restart glimepiride 2 mg twice daily with meals  Start Metformin 500 mg twice daily with meals.  Discussed with the patient the risks of elevated blood sugars and diabetic complications.    Diabetic retinopathy screening  Refer the patient to ophthalmology    Discussed with the patient about dietary restrictions and exercise regimen to help improve the glycemic control.    Interpreted the blood work-up/imaging results performed by the primary care/consulting physician -    Refills sent to pharmacy    Follow Up     Patient was given instructions and counseling regarding her condition or for health maintenance advice. Please see specific information pulled into the AVS if appropriate.       Thank you for asking  "me to see your patient, Catarina Sherwood in consultation.         Torres Rodriguez MD  05/13/21      EMR Dragon / transcription disclaimer:     \"Dictated utilizing Dragon dictation\".         "

## 2021-05-13 NOTE — PATIENT INSTRUCTIONS
New medications for diabetes    Glimepiride 2 mg 2 times a day with food  Metformin 500 mg 2 times a day with food      After picking up the freestyle khanh for blood sugar monitoring please contact the office so that way we could screen you for the placement of the device

## 2021-08-30 ENCOUNTER — TELEPHONE (OUTPATIENT)
Dept: FAMILY MEDICINE CLINIC | Facility: CLINIC | Age: 81
End: 2021-08-30

## 2021-08-30 NOTE — TELEPHONE ENCOUNTER
Caller: [facility]    Relationship: skilled nursing    Best call back number:  [phone]    What form or medical record are you requesting: MEDICINE HISTORY    How would you like to receive the form or medical records (pick-up, mail, fax): FAX  If fax, what is the fax number: [phone]    Timeframe paperwork needed: ASAP    Additional notes: HEIDI FROM [facility] STATES THAT THE PATIENT WAS ADMITTED 08/27/2021 AND DID NOT HAVE VA LIST OF CURRENT MEDICATIONS. PRAFUL IS ASKING FOR A CURRENT MED LIST ONLY

## 2021-09-14 ENCOUNTER — TELEPHONE (OUTPATIENT)
Dept: FAMILY MEDICINE CLINIC | Facility: CLINIC | Age: 81
End: 2021-09-14

## 2021-09-14 NOTE — TELEPHONE ENCOUNTER
Caller: [facility]    Relationship: [facility]    Best call back number:     What orders are you requesting (i.e. lab or imaging): PHYSICAL THERAPY    In what timeframe would the patient need to come in:     Where will you receive your lab/imaging services:     Additional notes: PATIENT WILL BE RELEASED FROM A REHAB FACILITY AND [facility] NEEDS ORDERS FOR HER IN HOUSE PHYSICAL THERAPY. HE ALSO WANTS TO KNOW THE LAST TIME SHE WAS SEEN BY DR BRENNAN.

## 2021-09-14 NOTE — TELEPHONE ENCOUNTER
Please find out which order she needs for physical therapy; is this for home health; if so can they accept verbal orders?    Let them know last seen by Dr. Diane November 4 2020.

## 2021-09-29 ENCOUNTER — OFFICE VISIT (OUTPATIENT)
Dept: INTERNAL MEDICINE | Facility: CLINIC | Age: 81
End: 2021-09-29

## 2021-09-29 VITALS
DIASTOLIC BLOOD PRESSURE: 70 MMHG | HEIGHT: 62 IN | BODY MASS INDEX: 25.69 KG/M2 | TEMPERATURE: 97.7 F | OXYGEN SATURATION: 99 % | WEIGHT: 139.6 LBS | HEART RATE: 85 BPM | SYSTOLIC BLOOD PRESSURE: 122 MMHG

## 2021-09-29 DIAGNOSIS — Z76.89 ENCOUNTER TO ESTABLISH CARE: ICD-10-CM

## 2021-09-29 DIAGNOSIS — S12.101S CLOSED NONDISPLACED FRACTURE OF SECOND CERVICAL VERTEBRA, UNSPECIFIED FRACTURE MORPHOLOGY, SEQUELA: ICD-10-CM

## 2021-09-29 DIAGNOSIS — S82.892S CLOSED FRACTURE OF LEFT ANKLE, SEQUELA: ICD-10-CM

## 2021-09-29 DIAGNOSIS — S12.001S CLOSED NONDISPLACED FRACTURE OF FIRST CERVICAL VERTEBRA, UNSPECIFIED FRACTURE MORPHOLOGY, SEQUELA: ICD-10-CM

## 2021-09-29 DIAGNOSIS — E11.65 TYPE 2 DIABETES MELLITUS WITH HYPERGLYCEMIA, WITHOUT LONG-TERM CURRENT USE OF INSULIN (HCC): Primary | ICD-10-CM

## 2021-09-29 PROCEDURE — 99214 OFFICE O/P EST MOD 30 MIN: CPT | Performed by: FAMILY MEDICINE

## 2021-09-29 NOTE — PROGRESS NOTES
"Chief Complaint  Establish Care (new patient ), Diabetes, and Ankle Injury    Subjective    History of Present Illness {CC  Problem List  Visit  Diagnosis   Encounters  Notes  Medications  Labs  Result Review Imaging  Media :23}     Catarina Sherwood presents to [facility] for   History of Present Illness   79 yo female present to [facility] care.  Pt is new to me and this office.  She has a history of diabetes.  Pt states she fell down 12 step at the end of August and sustained ankle and cervical spine fractures.  Wearing neck brace and ankle boot currently.  Seen at [facility] with initial diagnosis, has not seen ortho.  was in rehab at [place] for a few weeks. Now living at [facility]. She is unsure how long she is to stay in the brace and cast.     Diabetes- states off all medication since 9/15/21.  Pt states son just bought her medicatiton to her yesterday. Taking medication previously as prescribed. Not check BS often.  Denies issues with low BS.  Follow in Endocrinology, last seen earlier this year.     Objective     Vital Signs:   /70 (BP Location: Left arm, Patient Position: Sitting, Cuff Size: Adult)   Pulse 85   Temp 97.7 °F (36.5 °C) (Temporal)   Ht 157.5 cm (62.01\")   Wt 63.3 kg (139 lb 9.6 oz)   SpO2 99%   BMI 25.53 kg/m²   Physical Exam  Vitals reviewed.   Constitutional:       General: She is not in acute distress.  HENT:      Head: Normocephalic.      Comments: Wearing neck brace     Right Ear: Tympanic membrane normal.      Left Ear: Tympanic membrane normal.   Eyes:      Conjunctiva/sclera: Conjunctivae normal.   Cardiovascular:      Rate and Rhythm: Regular rhythm.      Heart sounds: Normal heart sounds.   Pulmonary:      Effort: Pulmonary effort is normal. No respiratory distress.      Breath sounds: Normal breath sounds. No wheezing.   Abdominal:      General: Bowel sounds are normal. There is no distension.      Palpations: " Abdomen is soft.      Tenderness: There is no abdominal tenderness.   Musculoskeletal:      Right lower leg: No edema.      Comments: L foot in boot   Neurological:      Mental Status: She is alert.   Psychiatric:         Mood and Affect: Mood normal.        Result Review  Data Reviewed:{ Labs  Result Review  Imaging  Med Tab  Media :23}   The following data was reviewed by: Belle Denis MD on 09/29/2021  Lab Results - Last 18 Months   Lab Units 04/29/21  1504 01/19/21  1604 09/03/20  1156   GLUCOSE mg/dL 194* 130* 288*   BUN mg/dL 12 10 12   CREATININE mg/dL 0.61 0.65 0.72   EGFR IF NONAFRICN AM mL/min/1.73 95 88 79   EGFR IF AFRICN AM mL/min/1.73 115 107 95   SODIUM mmol/L 139 140 133*   POTASSIUM mmol/L 4.5 4.6 4.5   CHLORIDE mmol/L 99 101 96*   CALCIUM mg/dL 10.7* 10.8* 10.1   ALBUMIN g/dL  --   --  4.70   BILIRUBIN mg/dL  --   --  0.5   ALK PHOS U/L  --   --  74   AST (SGOT) U/L  --   --  14   ALT (SGPT) U/L  --   --  12   CHOLESTEROL mg/dL 246*  --  251*   TRIGLYCERIDES mg/dL 196*  --  230*   HDL CHOL mg/dL 50  --  52   VLDL CHOLESTEROL ABDIRIZAK mg/dL 36  --  46   LDL CHOL mg/dL 160*  --  153*   HEMOGLOBIN A1C % 9.20* 7.80* 11.00*   MICROALB UR ug/mL 20.3  --  11.0   WBC 10*3/mm3  --   --  7.70   RBC 10*6/mm3  --   --  4.97   HEMATOCRIT %  --   --  45.0   MCV fL  --   --  90.5   MCH pg  --   --  29.8   TSH uIU/mL 1.010  --  0.991   FREE T4 ng/dL 1.23  --   --      Reviewed ED notes and last office not Endocrinology          Assessment and Plan {CC Problem List  Visit Diagnosis  ROS  Review (Popup)  [facility] Maintenance  Quality  BestPractice  Medications  SmartSets  SnapShot Encounters  Media :23}   Problem List Items Addressed This Visit        Endocrine and Metabolic    Diabetes mellitus (CMS/HCC) - Primary    Current Assessment & Plan     Diabetes is Chronic, following with Endocrinology .   Continue current treatment regimen.  Reminded to bring in blood sugar diary at next visit.  Discussed  ways to avoid symptomatic hypoglycemia.  Discussed foot care.  Reminded to get yearly retinal exam.  continue current medication, she now has them from her son  Diabetes will be reassessed in 3 months.         Relevant Orders    Hemoglobin A1c    Comprehensive metabolic panel    Microalbumin / Creatinine Urine Ratio - Urine, Clean Catch      Other Visit Diagnoses     Encounter to establish care        Closed nondisplaced fracture of first cervical vertebra, unspecified fracture morphology, sequela        Relevant Orders    Ambulatory Referral to Orthopedic Surgery    Closed nondisplaced fracture of second cervical vertebra, unspecified fracture morphology, sequela        Relevant Orders    Ambulatory Referral to Orthopedic Surgery    Closed fracture of left ankle, sequela        Relevant Orders    Ambulatory Referral to Orthopedic Surgery              Follow Up   Return in about 3 months (around 12/29/2021).  Patient was given instructions and counseling regarding her condition or for health maintenance advice. Please see specific information pulled into the AVS if appropriate.    EpicAct:[email or web address]_[email or web address],RunParams:STARTUPTYPE=FOLLOW    [email or web address]

## 2021-09-29 NOTE — ASSESSMENT & PLAN NOTE
Diabetes is Chronic, following with Endocrinology .   Continue current treatment regimen.  Reminded to bring in blood sugar diary at next visit.  Discussed ways to avoid symptomatic hypoglycemia.  Discussed foot care.  Reminded to get yearly retinal exam.  continue current medication, she now has them from her son  Diabetes will be reassessed in 3 months.

## 2021-09-30 LAB
ALBUMIN SERPL-MCNC: 4.4 G/DL (ref 3.5–5.2)
ALBUMIN/CREAT UR: 51 MG/G CREAT (ref 0–29)
ALBUMIN/GLOB SERPL: 1.7 G/DL
ALP SERPL-CCNC: 108 U/L (ref 39–117)
ALT SERPL-CCNC: 14 U/L (ref 1–33)
AST SERPL-CCNC: 18 U/L (ref 1–32)
BILIRUB SERPL-MCNC: 0.2 MG/DL (ref 0–1.2)
BUN SERPL-MCNC: 13 MG/DL (ref 8–23)
BUN/CREAT SERPL: 20.6 (ref 7–25)
CALCIUM SERPL-MCNC: 10.3 MG/DL (ref 8.6–10.5)
CHLORIDE SERPL-SCNC: 99 MMOL/L (ref 98–107)
CO2 SERPL-SCNC: 29.9 MMOL/L (ref 22–29)
CREAT SERPL-MCNC: 0.63 MG/DL (ref 0.57–1)
CREAT UR-MCNC: 40 MG/DL
GLOBULIN SER CALC-MCNC: 2.6 GM/DL
GLUCOSE SERPL-MCNC: 200 MG/DL (ref 65–99)
HBA1C MFR BLD: 7.5 % (ref 4.8–5.6)
MICROALBUMIN UR-MCNC: 20.2 UG/ML
POTASSIUM SERPL-SCNC: 4.9 MMOL/L (ref 3.5–5.2)
PROT SERPL-MCNC: 7 G/DL (ref 6–8.5)
SODIUM SERPL-SCNC: 136 MMOL/L (ref 136–145)

## 2021-10-06 ENCOUNTER — OFFICE VISIT (OUTPATIENT)
Dept: ORTHOPEDIC SURGERY | Facility: CLINIC | Age: 81
End: 2021-10-06

## 2021-10-06 VITALS — WEIGHT: 139 LBS | TEMPERATURE: 97.2 F | BODY MASS INDEX: 25.58 KG/M2 | HEIGHT: 62 IN

## 2021-10-06 DIAGNOSIS — S12.091A OTHER CLOSED NONDISPLACED FRACTURE OF FIRST CERVICAL VERTEBRA, INITIAL ENCOUNTER (HCC): ICD-10-CM

## 2021-10-06 DIAGNOSIS — M25.572 LEFT ANKLE PAIN, UNSPECIFIED CHRONICITY: Primary | ICD-10-CM

## 2021-10-06 DIAGNOSIS — S82.822A CLOSED TORUS FRACTURE OF DISTAL END OF LEFT FIBULA, INITIAL ENCOUNTER: ICD-10-CM

## 2021-10-06 DIAGNOSIS — S12.191A OTHER CLOSED NONDISPLACED FRACTURE OF SECOND CERVICAL VERTEBRA, INITIAL ENCOUNTER (HCC): ICD-10-CM
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
## 2021-10-06 PROCEDURE — 99213 OFFICE O/P EST LOW 20 MIN: CPT | Performed by: NURSE PRACTITIONER

## 2021-10-06 PROCEDURE — 73610 X-RAY EXAM OF ANKLE: CPT | Performed by: NURSE PRACTITIONER

## 2021-10-06 PROCEDURE — 72050 X-RAY EXAM NECK SPINE 4/5VWS: CPT | Performed by: NURSE PRACTITIONER

## 2021-10-06 RX ORDER — ROSUVASTATIN CALCIUM 5 MG/1
5 TABLET, COATED ORAL DAILY
COMMUNITY
End: 2022-02-03 | Stop reason: SDUPTHER

## 2021-10-06 NOTE — PATIENT INSTRUCTIONS
Stable Cervical Spine Fracture    A cervical spine fracture is a break or crack in one of the seven bones (vertebrae) that make up the spine of the neck. These bones hold up the head and protect the spinal cord. The spinal cord runs through the center of the vertebrae. The fracture is considered stable if there is a very low risk of problems happening during healing, and if the bones have not moved out of position.  What are the causes?  This condition may be caused by:  · Motor vehicle accidents.  · Injuries from sports such as diving, football, biking, wrestling, or skiing.  · Severe osteoporosis or other bone diseases. These may include:  ? Cancers that spread to the bone.  ? Metabolic abnormalities that cause bone weakness.  What are the signs or symptoms?  Symptoms of this condition include:  · Severe neck pain after an accident or fall. Pain may spread (radiate) down the shoulders or arms.  · Bruising or swelling on the back of the neck.  · Numbness, tingling, sudden muscle tightening (spasms), or weakness in the arms or legs or both.  How is this diagnosed?  This condition may be diagnosed based on:  · Your medical history.  · A physical exam of your neck, arms, and legs.  · Imaging studies of your neck, such as X-ray, CT scan, or MRI.  How is this treated?  This condition is treated with:  · A device that supports your chin and the back of your head (neck brace or cervical collar). This will keep your neck from moving during the healing process.  · Medicine to help relieve pain, if needed.  Follow these instructions at home:  If you have a neck brace or cervical collar:  · Wear the brace or collar as told by your health care provider. Do not remove it unless told by your health care provider.  · If you are allowed to remove your collar or brace for cleaning and bathing:  ? Follow your health care provider's instructions about how to safely take off the collar.  ? Wash and thoroughly dry the skin on your  neck. Check your skin for irritation or sores. If you see any, tell your health care provider.  ? Keep your collar or brace clean by wiping it with mild soap and water and letting it air-dry completely. The pads can be hand-washed with soap and water and air-dried completely.  · Ask your health care provider before making any adjustments to your collar or brace. Small adjustments may be needed over time to improve comfort and reduce pressure on your chin or on the back of your head.  · If your brace or collar is not waterproof:  ? Do not let it get wet.  ? Cover it with a watertight covering when you take a bath or a shower.  Managing pain, stiffness, and swelling  If directed, put ice on the injured area. To do this:  · If you have a removable brace or cervical collar, remove it as told by your health care provider.  · Put ice in a plastic bag.  · Place a towel between your skin and the bag.  · Leave the ice on for 20 minutes, 2-3 times a day.    Activity  · Ask your health care provider when it is safe to drive if you have a brace or collar on your neck.  · Ask your health care provider if the medicine prescribed to you requires you to avoid driving or using machinery.  · Do not lift anything that is heavier than 10 lb (4.5 kg), or the limit that you are told, until your health care provider says that it is safe.  · Return to your normal activities as told by your health care provider. Ask your health care provider what activities are safe for you.  General instructions  · Take over-the-counter and prescription medicines only as told by your health care provider.  · Do not take baths, swim, or use a hot tub until your health care provider approves. Ask your health care provider if you can take showers. You may only be allowed to take sponge baths.  · Do not use any products that contain nicotine or tobacco, such as cigarettes, e-cigarettes, and chewing tobacco. These can delay bone healing. If you need help  quitting, ask your health care provider.  · Your medicines may cause constipation. To prevent or treat constipation, you may need to:  ? Drink enough fluid to keep your urine pale yellow.  ? Take over-the-counter or prescription medicines.  ? Eat foods that are high in fiber, such as beans, whole grains, and fresh fruits and vegetables.  ? Limit foods that are high in fat and processed sugars, such as fried or sweet foods.  · Keep all follow-up visits as told by your health care provider. This is important. Follow-up visits will help prevent long-term (chronic) or permanent injury, pain, and disability. You may need follow-up X-rays or MRI 1-3 weeks after your injury.  Contact a health care provider if:  · You have irritation or sores on your skin from your brace or cervical collar.  Get help right away if:  · You have neck pain that gets worse.  · You develop difficulties swallowing or breathing.  · You develop swelling in your neck.  · You have any of the following problems in your arms or legs or both:  ? Numbness.  ? Weakness.  ? Burning pain.  ? Movement problems.  · You are unable to control when you urinate or have a bowel movement (incontinence).  · You have problems with coordination or difficulty walking.  These symptoms may represent a serious problem that is an emergency. Do not wait to see if the symptoms will go away. Get medical help right away. Call your local emergency services (911 in the [place] Do not drive yourself to the hospital.  Summary  · A cervical spine fracture is a break or crack in one of the seven bones (vertebrae) that make up the spine of the neck.  · Your fracture is considered stable if there is a very low risk of problems happening during healing.  · A fracture is treated with a device to support your neck and with pain medicine, if needed.  This information is not intended to replace advice given to you by your health care provider. Make sure you discuss any questions you have  with your health care provider.  Document Revised: 11/19/2020 Document Reviewed: 11/19/2020  Elsevier Patient Education © 2021 [employer]  Ankle Exercises  Ask your health care provider which exercises are safe for you. Do exercises exactly as told by your health care provider and adjust them as directed. It is normal to feel mild stretching, pulling, tightness, or mild discomfort as you do these exercises. Stop right away if you feel sudden pain or your pain gets worse. Do not begin these exercises until told by your health care provider.  Stretching and range-of-motion exercises  These exercises warm up your muscles and joints and improve the movement and flexibility of your ankle. These exercises may also help to relieve pain.  Dorsiflexion/plantar flexion    1. Sit with your __________ knee straight or bent. Do not rest your foot on anything.  2. Flex your __________ ankle to tilt the top of your foot toward your shin. This is called dorsiflexion.  3. Hold this position for __________ seconds.  4. Point your toes downward to tilt the top of your foot away from your shin. This is called plantar flexion.  5. Hold this position for __________ seconds.  Repeat __________ times. Complete this exercise __________ times a day.  Ankle alphabet    1. Sit with your __________ foot supported at your lower leg.  ? Do not rest your foot on anything.  ? Make sure your foot has room to move freely.  2. Think of your __________ foot as a paintbrush:  ? Move your foot to trace each letter of the alphabet in the air. Keep your hip and knee still while you trace the letters. Trace every letter from A to Z.  ? Make the letters as large as you can without causing or increasing any discomfort.  Repeat __________ times. Complete this exercise __________ times a day.  Passive ankle dorsiflexion  This is an exercise in which something or someone moves your ankle for you. You do not move it yourself.  1. Sit on a chair that is placed  on a non-carpeted surface.  2. Place your __________ foot on the floor, directly under your __________ knee. Extend your __________ leg for support.  3. Keeping your heel down, slide your __________ foot back toward the chair until you feel a stretch at your ankle or calf. If you do not feel a stretch, slide your buttocks forward to the edge of the chair while keeping your heel down.  4. Hold this stretch for __________ seconds.  Repeat __________ times. Complete this exercise __________ times a day.  Strengthening exercises  These exercises build strength and endurance in your ankle. Endurance is the ability to use your muscles for a long time, even after they get tired.  Dorsiflexors  These are muscles that lift your foot up.  1. Secure a rubber exercise band or tube to an object, such as a table leg, that will stay still when the band is pulled. Secure the other end around your __________ foot.  2. Sit on the floor, facing the object with your __________ leg extended. The band or tube should be slightly tense when your foot is relaxed.  3. Slowly flex your __________ ankle and toes to bring your foot toward your shin.  4. Hold this position for __________ seconds.  5. Slowly return your foot to the starting position, controlling the band as you do that.  Repeat __________ times. Complete this exercise __________ times a day.  Plantar flexors  These are muscles that push your foot down.  1. Sit on the floor with your __________ leg extended.  2. Loop a rubber exercise band or tube around the ball of your __________ foot. The ball of your foot is on the walking surface, right under your toes. The band or tube should be slightly tense when your foot is relaxed.  3. Slowly point your toes downward, pushing them away from you.  4. Hold this position for __________ seconds.  5. Slowly release the tension in the band or tube, controlling smoothly until your foot is back in the starting position.  Repeat __________  times. Complete this exercise __________ times a day.  Towel curls    1. Sit in a chair on a non-carpeted surface, and put your feet on the floor.  2. Place a towel in front of your feet. If told by your health care provider, add a __________ pound weight to the end of the towel.  3. Keeping your heel on the floor, put your __________ foot on the towel.  4. Pull the towel toward you by grabbing the towel with your toes and curling them under. Keep your heel on the floor.  5. Let your toes relax.  6. Grab the towel again. Keep pulling the towel until it is completely underneath your foot.  Repeat __________ times. Complete this exercise __________ times a day.  Standing plantar flexion  This is an exercise in which you use your toes to lift your body's weight while standing.  1. Stand with your feet shoulder-width apart.  2. Keep your weight spread evenly over the width of your feet while you rise up on your toes. Use a wall or table to steady yourself if needed, but try not to use it for support.  3. If this exercise is too easy, try these options:  ? Shift your weight toward your __________ leg until you feel challenged.  ? If told by your health care provider, lift your uninjured leg off the floor.  4. Hold this position for __________ seconds.  Repeat __________ times. Complete this exercise __________ times a day.  Tandem walking  1. Stand with one foot directly in front of the other.  2. Slowly raise your back foot up, lifting your heel before your toes, and place it directly in front of your other foot.  3. Continue to walk in this heel-to-toe way for __________ or for as long as told by your health care provider. Have a countertop or wall nearby to use if needed to keep your balance, but try not to hold onto anything for support.  Repeat __________ times. Complete this exercise __________ times a day.  This information is not intended to replace advice given to you by your health care provider. Make sure you  discuss any questions you have with your health care provider.  Document Revised: 09/14/2019 Document Reviewed: 09/16/2019  Elsevier Patient Education © 2021 [employer]

## 2021-10-06 NOTE — PROGRESS NOTES
Patient Name: Catarina Sherwood   YOB: 1940  Referring Primary Care Physician: Belle Denis MD  BMI: Body mass index is 25.42 kg/m².    Chief Complaint:    Chief Complaint   Patient presents with   • Left Ankle - Pain        HPI: New pt to me referred by PCP. Pt is in rehab after a fall down 12 steps 8-22-21.She was seen at  and [facility].  She is in a neck brace and cam boot/plaster cast. Here today for a follow up for her ankle and neck referred by PCP. Pt is ambulating with a cane. She was seen 9-29- 21 by her PCP.  Pt is confused about her follow up. She denies pain in neck. Chart is reviewed in Care Everywhere.     Catarina Sherwood is a 81 y.o. female who presents today for evaluation of   Chief Complaint   Patient presents with   • Left Ankle - Pain         Subjective   Medications:   Home Medications:  Current Outpatient Medications on File Prior to Visit   Medication Sig   • Blood Glucose Monitoring Suppl (Accu-Chek Mary Ann Plus) w/Device kit 1 device   • Continuous Blood Gluc  (FreeStyle Arpita 2 Sharon) device 1 Device Daily.   • Continuous Blood Gluc Sensor (FreeStyle Arpita 2 Sensor) misc 1 Device Every 14 (Fourteen) Days.   • glimepiride (Amaryl) 2 MG tablet Take 1 tablet by mouth 2 (Two) Times a Day With Meals.   • glucose blood (Accu-Chek Mary Ann Plus) test strip To check 3 - 4 times a day   • Lancets (accu-chek multiclix) lancets To check 3 - 4 times a day   • metFORMIN ER (GLUCOPHAGE-XR) 500 MG 24 hr tablet Take 1 tablet by mouth 2 (Two) Times a Day With Meals.   • rosuvastatin (CRESTOR) 5 MG tablet Take 5 mg by mouth Daily.   • ibuprofen (ADVIL,MOTRIN) 600 MG tablet Take 600 mg by mouth. 2 before bed     No current facility-administered medications on file prior to visit.     Current Medications:  Scheduled Meds:  Continuous Infusions:No current facility-administered medications for this visit.    PRN Meds:.    I have reviewed the patient's medical history in detail and updated the  "computerized patient record.  Review and summarization of old records includes:    Past Medical History:   Diagnosis Date   • Diabetes mellitus (HCC)         Past Surgical History:   Procedure Laterality Date   • CHOLECYSTECTOMY          Social History     Occupational History   • Not on file   Tobacco Use   • Smoking status: Never Smoker   • Smokeless tobacco: Never Used   Substance and Sexual Activity   • Alcohol use: Never   • Drug use: Never   • Sexual activity: Defer      Social History     Social History Narrative   • Not on file        Family History   Problem Relation Age of Onset   • Cancer Mother    • Diabetes Other        ROS: 14 point review of systems was performed and all other systems were reviewed and are negative except for documented findings in HPI and today's encounter.     Allergies: No Known Allergies  Constitutional:  Denies fever, shaking or chills   Eyes:  Denies change in visual acuity   HENT:  Denies nasal congestion or sore throat   Respiratory:  Denies cough or shortness of breath   Cardiovascular:  Denies chest pain or severe LE edema   GI:  Denies abdominal pain, nausea, vomiting, bloody stools or diarrhea   Musculoskeletal:  Numbness, tingling, pain, or loss of motor function only as noted above in history of present illness.  : Denies painful urination or hematuria  Integument:  Denies rash, lesion or ulceration   Neurologic:  Denies headache or focal weakness  Endocrine:  Denies lymphadenopathy  Psych:  Denies confusion or change in mental status   Hem:  Denies active bleeding    OBJECTIVE:  Physical Exam: 81 y.o. female  Wt Readings from Last 3 Encounters:   10/06/21 63 kg (139 lb)   09/29/21 63.3 kg (139 lb 9.6 oz)   08/25/21 62.1 kg (137 lb)     Ht Readings from Last 1 Encounters:   10/06/21 157.5 cm (62\")     Body mass index is 25.42 kg/m².  Vitals:    10/06/21 1332   Temp: 97.2 °F (36.2 °C)     Vital signs reviewed.     General Appearance:    Alert, cooperative, in no acute " distress                  Eyes: conjunctiva clear  ENT: external ears and nose atraumatic  CV: no peripheral edema  Resp: normal respiratory effort  Skin: no rashes or wounds; normal turgor  Psych: mood and affect appropriate  Lymph: no nodes appreciated  Neuro: gross sensation intact  Vascular:  Palpable peripheral pulse in noted extremity  Musculoskeletal Extremities: hard cervical collar on pt, fitting loose, short cam boot with a plaster stirrup on that was removed skin inspected to the left ankle warm dry and intact no lymphadenopathy compartments are soft she has some tenderness over the distal fibula calf is soft knee is nontender base the fifth metatarsal is nontender.  Cervical collar was removed she has diffuse tenderness to the C-spine no point tenderness soft collar was placed.  Equal grasp to bilateral upper extremity she is ambulating with a cane has sensation is intact capillary refill and distal pulses and sensation is intact    Radiology: Left ankle 3 views done today show a plaster cast stirrup and a healing spiral torus fracture of the distal fibula in good alignment no comparison films readily available.  Complete C-spine done under the direction of Dr. Callahan and reviewed by Dr. Callahan open-mouth AP flexion extension lateral for cervical fracture show healing C2 facet nondisplaced fracture no instability. CT showed more and xrays reviewed by Dr Callahan.    ACCESSION NUMBER: [id number]     DATE: 08/26/2021 1:02 PM     EXAMINATION: CR Ankle 1 Vw LT     PROVIDED INDICATION: STRESS view external     COMPARISON: Left ankle images 08/26/2012 1 1230 hours     TECHNIQUE: Portable stress view of the left ankle.     FINDINGS: Lucency is again seen through the distal fibula. No change in alignment is seen. Mortise joint remains intact.     IMPRESSION: No change in appearance of question fracture of the distal fibula post stress view.     Signed by Kingsley Ham M.D. on 8/26/2021 1:06 PM        RADIOLOGY REPORT     FACILITY:  [facility]   UNIT/AGE/GENDER: MARY.ED  ER      AGE:80 Y          SEX:F   PATIENT NAME/:  VENKAT OATES TABOR    1940   UNIT NUMBER:  [id number]   ACCOUNT NUMBER:  [id number]   ACCESSION NUMBER:  [id number]     CT OF THE CERVICAL SPINE WITHOUT CONTRAST     DATE: 2021     COMPARISON: None available     INDICATION: Neck pain after fall. Pain at base of skull..         FINDINGS: Axial, coronal, and sagittal images of the cervical spine were obtained.  Radiation dose reduction techniques were utilized per ALARA protocol.     Best demonstrated on coronal images 21-24 and axial series 4 images 76-85, there are vertically oriented fractures of the right and left aspects of the C2 vertebral body. The right C2 vertebral body extends into the lateral mass and involves the right   transverse foramen and the right superior articular process at the right C1-C2 joint. Fracture at the left aspect of the C2 vertebral body is nondisplaced. There is no significant subluxation at C1-C2.     As demonstrated on axial series 4 image 70, there are small comminuted fractures of the medial aspect of the right lateral mass of C1, with involvement of the right C1-C2 articulation. No significant displacement at these fractures. Possible subtle   nondisplaced fracture of the posterior cortex of the posterior arch of C1 just left of midline on series 4 image 65.     No abnormal malalignment at the craniocervical junction. No fractures of the occipital condyles.     No other fractures of the cervical spine. 2 mm grade 1 degenerative anterolisthesis of C4 on C5. No facet subluxation is. Multilevel severe facet arthropathy. Severe degenerative disease at C5-C6. Other multilevel mild degenerative disease. Multilevel   moderate central canal stenosis due to posterior disc osteophyte complexes. Multilevel bilateral neural foraminal narrowing. No prevertebral soft tissue swelling.  Age-indeterminate soft tissue swelling posterior to the odontoid process.     IMPRESSION:   1. Acute, nondisplaced fractures of the right and left aspects of the C2 vertebral body with the right sided fracture involving the right transverse foramen and the superior articular process. There is no associated C1-C2 subluxation. If further   evaluation of vertebral artery injury is required, CT arteriogram of the neck would be helpful.     2. Small comminuted fractures at the medial aspect of the right lateral mass of C1, involving the inferior articular facet. No significant displacement of these fractures. There may be a subtle nondisplaced cortical fracture of the left posterior arch of    C1 versus a vascular foramen.     3. No other acute fractures of the cervical spine.     4. Multilevel degenerative spondylosis of the cervical spine.     Dictated by: Manoj Yo M.D.     Images and Report reviewed and interpreted by: Manoj Yo M.D.     <PS><Electronically signed by: Manoj Yo M.D.>   08/25/2021 2236     D: 08/25/2021 2224   T: 08/25/2021 2224          Assessment:     ICD-10-CM ICD-9-CM   1. Left ankle pain, unspecified chronicity  M25.572 719.47   2. Other closed nondisplaced fracture of second cervical vertebra, initial encounter ([facility])  S12.191A 805.02   3. Fall from slip, trip, or stumble, initial encounter  W01.0XXA E885.9   4. Closed torus fracture of distal end of left fibula, initial encounter  S82.822A 823.41   5. Other closed nondisplaced fracture of first cervical vertebra, initial encounter ([facility])  S12.091A 805.01        MDM/Plan:   The diagnosis(es), natural history, pathophysiology and treatment for diagnosis(es) were discussed. Opportunity given and questions answered.  Biomechanics of pertinent body areas discussed.  When appropriate, the use of ambulatory aids discussed.    The diagnosis(es), natural history, pathophysiology and treatment for diagnosis(es) were discussed.  Opportunity given and questions answered.  Biomechanics of pertinent body areas discussed.  When appropriate, the use of ambulatory aids discussed.  EXERCISES:  Advice on benefits of, and types of regular/moderate exercise pertaining to orthopedic diagnosis(es).  MEDICATIONS:  The risks, benefits, warnings,side effects and alternatives of medications discussed.  Inflammation/pain control; with cold, heat, elevation and/or liniments discussed as appropriate  PT referral.  HOME EXERCISE/PT program encouraged  SPECIALTY REFERRAL  CONSULT: This Consult is done at the request of a requesting provider to whom I will send this report with this rendered opinion.    Pt is in a plaster cast stirrup and cam walking boot - plaster cast removed in exam room skin intact - she can wean out of the boot and start ROM and PT WBAT she has been in this hard cast and has some tenderness over the fibula that remains and I think from being immobilized for so long we will need to get her moving this was explained to the patient in will place an order for physical therapy  I consulted with Dr. Callahan and did a C-spine complete he interpreted fractures were healed in place and is soft collar and can follow-up with Dr. Callahan  10/6/2021    Much of this encounter note is an electronic transcription/translation of spoken language to printed text. The electronic translation of spoken language may permit erroneous, or at times, nonsensical words or phrases to be inadvertently transcribed; Although I have reviewed the note for such errors, some may still exist

## 2021-10-14 ENCOUNTER — TELEPHONE (OUTPATIENT)
Dept: ORTHOPEDIC SURGERY | Facility: CLINIC | Age: 81
End: 2021-10-14

## 2021-10-14 NOTE — TELEPHONE ENCOUNTER
Caller: YOVANI     Relationship to patient: HOME HEALTH    Best call back number: [phone]    Patient is needing: NEEDING TO KNOW IF PATIENT IS TO CONTINUE WEARING HER NECK BRACE. PLEASE ADVISE.

## 2021-11-02 ENCOUNTER — OFFICE VISIT (OUTPATIENT)
Dept: ENDOCRINOLOGY | Age: 81
End: 2021-11-02

## 2021-11-02 VITALS
DIASTOLIC BLOOD PRESSURE: 70 MMHG | SYSTOLIC BLOOD PRESSURE: 122 MMHG | BODY MASS INDEX: 23.77 KG/M2 | HEART RATE: 80 BPM | HEIGHT: 62 IN | RESPIRATION RATE: 20 BRPM | WEIGHT: 129.2 LBS | OXYGEN SATURATION: 99 %

## 2021-11-02 DIAGNOSIS — E11.65 TYPE 2 DIABETES MELLITUS WITH HYPERGLYCEMIA, WITHOUT LONG-TERM CURRENT USE OF INSULIN (HCC): Primary | ICD-10-CM

## 2021-11-02 DIAGNOSIS — E78.2 HYPERLIPEMIA, MIXED: ICD-10-CM

## 2021-11-02 PROCEDURE — 99214 OFFICE O/P EST MOD 30 MIN: CPT | Performed by: NURSE PRACTITIONER

## 2021-11-02 NOTE — PROGRESS NOTES
"Chief Complaint  Diabetes (3-4 month fup dm2 does not check bs med eval unknown of eye exam ??) and Hyperlipidemia    Subjective          Catarina Sherwood presents to [facility] ENDOCRINOLOGY  History of Present Illness     I have reviewed PMH, allergies and medications UTD at this visit     She is in a soft neck brace and walking boot after falling at her sons house  She lives at [facility] currently     Type 2 dm    Diagnosed in 2012.  Today in clinic pt reports being on  glimepiride 2 mg twice daily, metformin 500 mg twice daily   Checks BG - no  Dm retinopathy - x,Last eye exam - needs to arrange   Dm nephropathy - x  Dm neuropathy - yes ,Dm neuropathy meds - no  CAD - x  CVA -x  Episodes of hypoglycemia - none  Pt is physically active. weight has been stable.   Pt tries to follow DM diet for most part.   On statin   Lab Results   Component Value Date    HGBA1C 7.50 (H) 09/29/2021     Lab Results   Component Value Date    CHLPL 246 (H) 04/29/2021    TRIG 196 (H) 04/29/2021    HDL 50 04/29/2021     (H) 04/29/2021         Objective   Vital Signs:   /70 (BP Location: Left arm, Patient Position: Sitting, Cuff Size: Adult)   Pulse 80   Resp 20   Ht 157.5 cm (62\")   Wt 58.6 kg (129 lb 3.2 oz)   SpO2 99%   BMI 23.63 kg/m²     Physical Exam  Vitals reviewed.   Constitutional:       General: She is not in acute distress.  HENT:      Head: Normocephalic and atraumatic.   Cardiovascular:      Rate and Rhythm: Normal rate and regular rhythm.   Pulmonary:      Effort: Pulmonary effort is normal. No respiratory distress.   Musculoskeletal:         General: No signs of injury. Normal range of motion.      Cervical back: Normal range of motion and neck supple.   Skin:     General: Skin is warm and dry.   Neurological:      Mental Status: She is alert and oriented to person, place, and time. Mental status is at baseline.   Psychiatric:         Mood and Affect: Mood normal.         " Behavior: Behavior normal.         Thought Content: Thought content normal.         Judgment: Judgment normal.          Result Review :   The following data was reviewed by: DWAINE Cuevas on 11/02/2021:  Common labs    Common Labsle 1/19/21 1/19/21 4/29/21 4/29/21 4/29/21 4/29/21 9/29/21 9/29/21 9/29/21    1604 1604 1504 1504 1504 1504 1106 1106 1106   Glucose  130 (A) 194 (A)     200 (A)    BUN  10 12     13    Creatinine  0.65 0.61     0.63    eGFR Non  Am  88 95     91    eGFR  Am  107 115     111    Sodium  140 139     136    Potassium  4.6 4.5     4.9    Chloride  101 99     99    Calcium  10.8 (A) 10.7 (A)     10.3    Total Protein        7.0    Albumin        4.40    Total Bilirubin        0.2    Alkaline Phosphatase        108    AST (SGOT)        18    ALT (SGPT)        14    Total Cholesterol    246 (A)        Triglycerides    196 (A)        HDL Cholesterol    50        LDL Cholesterol     160 (A)        Hemoglobin A1C 7.80 (A)     9.20 (A) 7.50 (A)     Microalbumin, Urine     20.3    20.2   (A) Abnormal value       Comments are available for some flowsheets but are not being displayed.                     Assessment and Plan    Diagnoses and all orders for this visit:    1. Type 2 diabetes mellitus with hyperglycemia, without long-term current use of insulin (HCC) (Primary)  -     Ambulatory Referral to Ophthalmology  -     Hemoglobin A1c; Future  -     Comprehensive Metabolic Panel; Future  -     Microalbumin / Creatinine Urine Ratio - Urine, Clean Catch; Future  -     Lipid Panel; Future    2. Hyperlipemia, mixed  -     Hemoglobin A1c; Future  -     Comprehensive Metabolic Panel; Future  -     Microalbumin / Creatinine Urine Ratio - Urine, Clean Catch; Future  -     Lipid Panel; Future        Follow Up   No follow-ups on file.   will see dr causey in feb 2022  a1c improving, will keep conservative measures for now with her recent fall   Continue metformin and glimepiride  Yearly  diabetic eye exams  Daily diabetic foot care  Diabetic diet  Routine physical activity as tolerated  On statin  Consider nephrology    Patient was given instructions and counseling regarding her condition or for health maintenance advice. Please see specific information pulled into the AVS if appropriate.     DWAINE Cuevas

## 2021-11-05 ENCOUNTER — OFFICE VISIT (OUTPATIENT)
Dept: ORTHOPEDIC SURGERY | Facility: CLINIC | Age: 81
End: 2021-11-05

## 2021-11-05 ENCOUNTER — IMMUNIZATION (OUTPATIENT)
Dept: VACCINE CLINIC | Facility: HOSPITAL | Age: 81
End: 2021-11-05

## 2021-11-05 VITALS — WEIGHT: 129 LBS | TEMPERATURE: 96.2 F | BODY MASS INDEX: 23.74 KG/M2 | HEIGHT: 62 IN

## 2021-11-05 DIAGNOSIS — M54.2 CERVICAL SPINE PAIN: Primary | ICD-10-CM

## 2021-11-05 DIAGNOSIS — M25.572 LEFT ANKLE PAIN, UNSPECIFIED CHRONICITY: ICD-10-CM

## 2021-11-05 PROCEDURE — 73610 X-RAY EXAM OF ANKLE: CPT | Performed by: ORTHOPAEDIC SURGERY

## 2021-11-05 PROCEDURE — 91300 HC SARSCOV02 VAC 30MCG/0.3ML IM: CPT | Performed by: INTERNAL MEDICINE

## 2021-11-05 PROCEDURE — 72040 X-RAY EXAM NECK SPINE 2-3 VW: CPT | Performed by: ORTHOPAEDIC SURGERY

## 2021-11-05 PROCEDURE — 0004A ADM SARSCOV2 30MCG/0.3ML BOOSTER: CPT | Performed by: INTERNAL MEDICINE

## 2021-11-05 PROCEDURE — 99214 OFFICE O/P EST MOD 30 MIN: CPT | Performed by: ORTHOPAEDIC SURGERY

## 2021-11-05 NOTE — PROGRESS NOTES
New patient or new problem visit    CC: Cervical and right ankle fractures    HPI: Today she has no complaints but is being treated for neck and ankle fractures.  Nearly 3 months ago she fell and was initially treated at [facility] and followed up recently with Sosa duran.  She had mild C1 fracture a couple of C2 fractures which were seen in brace at [facility] and then nondisplaced lateral malleolar fracture for which she was originally casted and at last visit placed in a fracture walker by Sosa duran.  Patient describes no pain complaints in either area no numbness tingling weakness imbalance or falls.  She is currently using a cane walking on a fracture walker and has a soft collar which I ordered a couple of weeks ago.    PFSH: See attached    ROS: As above    PE: On exam neck is nontender with good motion.  Good strength in the upper extremities and no Sachi test is negative no evidence of clonus.  Left ankle is completely nontender of course there are some stiffness because she has been in a brace.    XRAY: Plain film x-rays of cervical spine include flexion-extension lateral open-mouth views and there is absolutely no instability there is expected age-appropriate degenerative changes and no obvious evidence of the fractures.  A CT scan performed at [facility] demonstrated a nondisplaced C2 one fracture and to C2 body fractures.  These were also nondisplaced.  I do not have those images but I do not think they are crucial    Other: n/a    Impression: Healed cervical stable fractures and left lateral malleolar fracture    Plan: We will discontinue the soft collar and the fracture walker.  I offered physical therapy and she has some help at the facility in which she stays.  We can order formal physical therapy if needed I can see her back if she develops pain in either area which I do not think that she will.

## 2022-02-03 ENCOUNTER — OFFICE VISIT (OUTPATIENT)
Dept: ENDOCRINOLOGY | Age: 82
End: 2022-02-03

## 2022-02-03 DIAGNOSIS — IMO0002 SEVERE UNCONTROLLED DIABETES MELLITUS: ICD-10-CM

## 2022-02-03 DIAGNOSIS — E55.9 VITAMIN D DEFICIENCY: ICD-10-CM

## 2022-02-03 DIAGNOSIS — E11.65 TYPE 2 DIABETES MELLITUS WITH HYPERGLYCEMIA, WITHOUT LONG-TERM CURRENT USE OF INSULIN: Primary | ICD-10-CM

## 2022-02-03 DIAGNOSIS — E78.2 HYPERLIPEMIA, MIXED: ICD-10-CM

## 2022-02-03 PROCEDURE — 99214 OFFICE O/P EST MOD 30 MIN: CPT | Performed by: INTERNAL MEDICINE

## 2022-02-03 RX ORDER — ROSUVASTATIN CALCIUM 5 MG/1
5 TABLET, COATED ORAL DAILY
Qty: 90 TABLET | Refills: 3 | Status: SHIPPED | OUTPATIENT
Start: 2022-02-03 | End: 2023-02-03

## 2022-02-03 NOTE — PROGRESS NOTES
Chief Complaint  Type 2 dm    Subjective          History of Present Illness    Catarina Sherwood 81 y.o. presents with Type 2 dm as a F/u  patient.     Type 2 dm - Diagnosed about 10 years ago.   Today in clinic pt reports being on glimepiride 2 mg po bid with meals, metformin 500 mg po bid.   FBG - 100 - 120  Pre meals - 100 - 180  Checks BG - 1 - 2 time a miguel  Sensor - x  Dm retinopathy - x ,Last eye exam - uptodate for exam.   Dm nephropathy - x  Dm neuropathy -x ,Dm neuropathy meds - n/a  CAD -x  CVA -  Episodes of hypoglycemia -   Pt is physically active. weight has been stable.   Pt tries to follow DM diet for most part.     Recovered from the injury no longer in neck brace or boot.   Still lives at [facility].    HLP - Ran out of crestor and has been taking it for a while.     Reviewed primary care physician's/consulting physician documentation and lab results     You have chosen to receive care through a telephone visit. Do you consent to use a telephone visit for your medical care today? Yes  This is a telephone visit.     I have reviewed the patient's allergies, medicines, past medical hx, family hx and social hx in detail.    Objective   Vital Signs:   There were no vitals taken for this visit.  Physical Exam   General appearance-pleasant, no distress  Respiratory-normal breathing appreciated.  No respiratory distress noted  Ear nose and throat-no hard of hearing.  Neurological assessment-alert and oriented x3.    Result Review :   The following data was reviewed by: Torres Rodriguez MD on 02/03/2022:  Office Visit on 09/29/2021   Component Date Value Ref Range Status   • Hemoglobin A1C 09/29/2021 7.50* 4.80 - 5.60 % Final    Comment: Hemoglobin A1C Ranges:  Increased Risk for Diabetes  5.7% to 6.4%  Diabetes                     >= 6.5%  Diabetic Goal                < 7.0%     • Glucose 09/29/2021 200* 65 - 99 mg/dL Final   • BUN 09/29/2021 13  8 - 23 mg/dL Final   • Creatinine 09/29/2021 0.63  0.57 -  1.00 mg/dL Final   • eGFR Non  Am 09/29/2021 91  >60 mL/min/1.73 Final    Comment: The MDRD GFR formula is only valid for adults with stable  renal function between ages 18 and 70.     • eGFR  Am 09/29/2021 111  >60 mL/min/1.73 Final   • BUN/Creatinine Ratio 09/29/2021 20.6  7.0 - 25.0 Final   • Sodium 09/29/2021 136  136 - 145 mmol/L Final   • Potassium 09/29/2021 4.9  3.5 - 5.2 mmol/L Final   • Chloride 09/29/2021 99  98 - 107 mmol/L Final   • Total CO2 09/29/2021 29.9* 22.0 - 29.0 mmol/L Final   • Calcium 09/29/2021 10.3  8.6 - 10.5 mg/dL Final   • Total Protein 09/29/2021 7.0  6.0 - 8.5 g/dL Final   • Albumin 09/29/2021 4.40  3.50 - 5.20 g/dL Final   • Globulin 09/29/2021 2.6  gm/dL Final   • A/G Ratio 09/29/2021 1.7  g/dL Final   • Total Bilirubin 09/29/2021 0.2  0.0 - 1.2 mg/dL Final   • Alkaline Phosphatase 09/29/2021 108  39 - 117 U/L Final   • AST (SGOT) 09/29/2021 18  1 - 32 U/L Final   • ALT (SGPT) 09/29/2021 14  1 - 33 U/L Final   • Creatinine, Urine 09/29/2021 40.0  Not Estab. mg/dL Final   • Microalbumin, Urine 09/29/2021 20.2  Not Estab. ug/mL Final   • Microalbumin/Creatinine Ratio 09/29/2021 51* 0 - 29 mg/g creat Final    Comment:                        Normal:                0 -  29                         Moderately increased: 30 - 300                         Severely increased:       >300       Data reviewed: PCP notes       Results Review:    I reviewed the patient's new clinical results.     Assessment and Plan    Problem List Items Addressed This Visit        Other    Diabetes mellitus (HCC) - Primary    Relevant Orders    TSH    T3, Free    T4, Free    Basic Metabolic Panel    Hemoglobin A1c    TSH    T4, Free    Basic Metabolic Panel    Hemoglobin A1c    Lipid Panel    Vitamin D 25 Hydroxy      Other Visit Diagnoses     Hyperlipemia, mixed        Relevant Medications    rosuvastatin (CRESTOR) 5 MG tablet    Other Relevant Orders    TSH    T3, Free    T4, Free    Basic Metabolic  "Panel    Hemoglobin A1c    TSH    T4, Free    Basic Metabolic Panel    Hemoglobin A1c    Lipid Panel    Vitamin D 25 Hydroxy    Severe uncontrolled diabetes mellitus (HCC)        Relevant Orders    TSH    T3, Free    T4, Free    Basic Metabolic Panel    Hemoglobin A1c    TSH    T4, Free    Basic Metabolic Panel    Hemoglobin A1c    Lipid Panel    Vitamin D 25 Hydroxy    Vitamin D deficiency         Relevant Orders    Vitamin D 25 Hydroxy        Type 2 dm - uncontrolled with hyperglycemia   Continue the above medications  Adjust the meds based on BW.     Hyperlipidemia   Resume crestor 5 mg po daily.     I spent 32 minutes caring for Catarina on this date of service. This time includes time spent by me in the following activities:preparing for the visit, reviewing tests, obtaining and/or reviewing a separately obtained history, counseling and educating the patient/family/caregiver, ordering medications, tests, or procedures, documenting information in the medical record, independently interpreting results and communicating that information with the patient/family/caregiver and care coordination          Interpreted the blood work-up/imaging results performed by the primary care/consulting physician -    Refills sent to pharmacy    Follow Up     Patient was given instructions and counseling regarding her condition or for health maintenance advice. Please see specific information pulled into the AVS if appropriate.       Thank you for asking me to see your patient, Catarina Sherwood in consultation.         Torres Rodriguez MD  02/03/22      EMR Dragon / transcription disclaimer:     \"Dictated utilizing Dragon dictation\".         "

## 2022-07-19 RX ORDER — GLIMEPIRIDE 2 MG/1
TABLET ORAL
Qty: 30 TABLET | Refills: 11 | Status: SHIPPED | OUTPATIENT
Start: 2022-07-19 | End: 2022-12-23 | Stop reason: SDUPTHER

## 2022-07-19 NOTE — TELEPHONE ENCOUNTER
Rx Refill Note  Requested Prescriptions     Pending Prescriptions Disp Refills    glimepiride (AMARYL) 2 MG tablet [Pharmacy Med Name: GLIMEPIRIDE 2MG TABLETS] 30 tablet 11     Sig: TAKE 1 TABLET BY MOUTH TWICE DAILY WITH MEALS      Last office visit with prescribing clinician: 2/3/2022      Next office visit with prescribing clinician: 2/7/2023            GAGE HERRERA MA  07/19/22, 12:00 EDT

## 2022-12-20 ENCOUNTER — OFFICE VISIT (OUTPATIENT)
Dept: INTERNAL MEDICINE | Facility: CLINIC | Age: 82
End: 2022-12-20

## 2022-12-20 VITALS
HEIGHT: 60 IN | DIASTOLIC BLOOD PRESSURE: 72 MMHG | SYSTOLIC BLOOD PRESSURE: 124 MMHG | OXYGEN SATURATION: 97 % | BODY MASS INDEX: 27.48 KG/M2 | HEART RATE: 79 BPM | TEMPERATURE: 97.7 F | WEIGHT: 140 LBS

## 2022-12-20 DIAGNOSIS — E11.65 TYPE 2 DIABETES MELLITUS WITH HYPERGLYCEMIA, WITHOUT LONG-TERM CURRENT USE OF INSULIN: Primary | ICD-10-CM

## 2022-12-20 DIAGNOSIS — E78.5 HYPERLIPIDEMIA, UNSPECIFIED HYPERLIPIDEMIA TYPE: ICD-10-CM

## 2022-12-20 LAB
CHOLEST SERPL-MCNC: 276 MG/DL (ref 0–200)
CHOLEST/HDLC SERPL: 4.6 {RATIO}
HBA1C MFR BLD: 11.8 % (ref 4.8–5.6)
HDLC SERPL-MCNC: 60 MG/DL (ref 40–60)
LDLC SERPL CALC-MCNC: 176 MG/DL (ref 0–100)
TRIGL SERPL-MCNC: 215 MG/DL (ref 0–150)
VLDLC SERPL CALC-MCNC: 40 MG/DL (ref 5–40)

## 2022-12-20 PROCEDURE — 99213 OFFICE O/P EST LOW 20 MIN: CPT

## 2022-12-20 NOTE — PATIENT INSTRUCTIONS
Limit intake of sugar and carbohydrates (potatoes, rice, pasta and breads). Limit intake of red meat, pork, fried foods and high-fat dairy products. Continue physical activity as tolerated. Labs today. Follow-up in 3 months to determine if medication is needed to be resumed.

## 2022-12-20 NOTE — PROGRESS NOTES
"Chief Complaint  Establish Care, Tingling (Tingling and numbness in hands), and Diabetes (Is not taking medications at this time,  states she controls sugar by diet)    Subjective        Catarina Sherwood presents to [facility] PRIMARY CARE  History of Present Illness  82 y.o. female presenting with hand tingling and to establish care. Previous patient of Dr. Denis. Born in [place]. Member of [employer]. Lives at [facility]. Fell down stairs in 2021 with multiple fractures in neck. Healed well. Seeing Dr. Rodriguez for diabetes. Last seen by Dr. Rodriguez in February 2022. Prescribed metformin and glimepiride but states she is not taking any medications. She is managing blood sugar through diet and exercise. Prescribed rosuvastatin but not taking as well. Tingling in hands started about a month ago. Present bilaterally in fingertips and palms but does not go up the forearms.       Objective   Vital Signs:  /72   Pulse 79   Temp 97.7 °F (36.5 °C)   Ht 152.4 cm (60\")   Wt 63.5 kg (140 lb)   SpO2 97%   BMI 27.34 kg/m²   Estimated body mass index is 27.34 kg/m² as calculated from the following:    Height as of this encounter: 152.4 cm (60\").    Weight as of this encounter: 63.5 kg (140 lb).          Physical Exam  Vitals reviewed.   Constitutional:       Appearance: Normal appearance.   HENT:      Head: Normocephalic.   Musculoskeletal:         General: Normal range of motion.      Cervical back: Normal range of motion.   Skin:     General: Skin is warm and dry.      Capillary Refill: Capillary refill takes less than 2 seconds.   Neurological:      General: No focal deficit present.      Mental Status: She is alert and oriented to person, place, and time.      Sensory: Sensory deficit present.      Motor: Motor function is intact.      Gait: Gait is intact.   Psychiatric:         Mood and Affect: Mood normal.         Behavior: Behavior normal.         Thought Content: " Thought content normal.         Judgment: Judgment normal.        Result Review :    Common labs    Common Labs 2/10/22 2/10/22 2/10/22    1256 1256 1256   Glucose 86     BUN 15     Creatinine 0.68     eGFR Non  Am 83     eGFR  Am 96     Sodium 137     Potassium 4.7     Chloride 99     Calcium 10.0     Total Cholesterol   258 (A)   Triglycerides   84   HDL Cholesterol   64   LDL Cholesterol    180 (A)   Hemoglobin A1C  6.0 (A)    (A) Abnormal value       Comments are available for some flowsheets but are not being displayed.           Current Outpatient Medications on File Prior to Visit   Medication Sig Dispense Refill   • Blood Glucose Monitoring Suppl (Accu-Chek Mary Ann Plus) w/Device kit 1 device 1 kit 0   • Continuous Blood Gluc  (FreeStyle Arpita 2 [place]) device 1 Device Daily. 2 each 3   • Continuous Blood Gluc Sensor (FreeStyle Arpita 2 Sensor) misc 1 Device Every 14 (Fourteen) Days. 2 each 3   • glimepiride (AMARYL) 2 MG tablet TAKE 1 TABLET BY MOUTH TWICE DAILY WITH MEALS 30 tablet 11   • glucose blood (Accu-Chek Mary Ann Plus) test strip To check 3 - 4 times a day 100 each 2   • ibuprofen (ADVIL,MOTRIN) 600 MG tablet Take 600 mg by mouth. 2 before bed     • Lancets (accu-chek multiclix) lancets To check 3 - 4 times a day 100 each 2   • metFORMIN ER (GLUCOPHAGE-XR) 500 MG 24 hr tablet Take 1 tablet by mouth 2 (Two) Times a Day With Meals. 60 tablet 11   • rosuvastatin (CRESTOR) 5 MG tablet Take 1 tablet by mouth Daily. 90 tablet 3     No current facility-administered medications on file prior to visit.                 Assessment and Plan   Diagnoses and all orders for this visit:    1. Type 2 diabetes mellitus with hyperglycemia, without long-term current use of insulin (HCC) (Primary)  -     Hemoglobin A1c    2. Hyperlipidemia, unspecified hyperlipidemia type  -     Lipid Panel With / Chol / HDL Ratio      Patient Instructions   Limit intake of sugar and carbohydrates (potatoes, rice,  pasta and breads). Limit intake of red meat, pork, fried foods and high-fat dairy products. Continue physical activity as tolerated. Labs today. Follow-up in 3 months to determine if medication is needed to be resumed.              Follow Up   Return in about 3 months (around 3/20/2023) for Recheck.  Patient was given instructions and counseling regarding her condition or for health maintenance advice. Please see specific information pulled into the AVS if appropriate.

## 2022-12-23 DIAGNOSIS — E11.65 TYPE 2 DIABETES MELLITUS WITH HYPERGLYCEMIA, WITHOUT LONG-TERM CURRENT USE OF INSULIN: Primary | ICD-10-CM

## 2022-12-23 RX ORDER — METFORMIN HYDROCHLORIDE 500 MG/1
500 TABLET, EXTENDED RELEASE ORAL 2 TIMES DAILY WITH MEALS
Qty: 60 TABLET | Refills: 11 | Status: SHIPPED | OUTPATIENT
Start: 2022-12-23 | End: 2023-12-23

## 2022-12-23 RX ORDER — GLIMEPIRIDE 2 MG/1
2 TABLET ORAL 2 TIMES DAILY WITH MEALS
Qty: 30 TABLET | Refills: 11 | Status: SHIPPED | OUTPATIENT
Start: 2022-12-23

## 2022-12-23 NOTE — PROGRESS NOTES
Hemoglobin A1c is significantly elevated from February of this year.  Previously 6.0, currently 11.8.  Please contact Dr. Rodriguez for a follow-up appointment to discuss diabetes medications.  In the meantime limit intake of sugar and carbohydrates such as potatoes, rice, pasta and breads.  Prescriptions for glimepiride 2 mg by mouth 2 times a day with meals and metformin  mg by mouth 2 times a day with meals have been sent to [facility] on [address] and [address].  Please resume medications as prescribed.      Triglycerides are elevated to 215.  Previous lab work showed triglycerides at 84, 10 months prior.  LDL (bad cholesterol) elevated at 176.  This is a slight improvement from 10 months ago, where LDL was at 180.  Continue rosuvastatin as prescribed. Recommend limiting intake of red meat, pork, fried foods and high fat dairy products. Limit intake of alcohol. Recommend at least 30 minutes of heart elevating exercises three days a week as tolerated.

## 2023-03-23 ENCOUNTER — OFFICE VISIT (OUTPATIENT)
Dept: INTERNAL MEDICINE | Facility: CLINIC | Age: 83
End: 2023-03-23
Payer: MEDICARE

## 2023-03-23 VITALS
TEMPERATURE: 98.2 F | OXYGEN SATURATION: 96 % | BODY MASS INDEX: 27.64 KG/M2 | HEIGHT: 60 IN | WEIGHT: 140.8 LBS | DIASTOLIC BLOOD PRESSURE: 76 MMHG | SYSTOLIC BLOOD PRESSURE: 141 MMHG | HEART RATE: 81 BPM

## 2023-03-23 DIAGNOSIS — E11.65 TYPE 2 DIABETES MELLITUS WITH HYPERGLYCEMIA, WITHOUT LONG-TERM CURRENT USE OF INSULIN: Primary | ICD-10-CM

## 2023-03-23 PROCEDURE — 1159F MED LIST DOCD IN RCRD: CPT

## 2023-03-23 PROCEDURE — 1160F RVW MEDS BY RX/DR IN RCRD: CPT

## 2023-03-23 PROCEDURE — 99214 OFFICE O/P EST MOD 30 MIN: CPT

## 2023-03-23 NOTE — PROGRESS NOTES
"Chief Complaint  Follow-up, Diabetes, and Hyperglycemia    Subjective        Catarina Sherwood presents to [facility] PRIMARY CARE  History of Present Illness  82-year-old male presenting with diabetes follow-up.  Exercises once a week and does activities within his apartment community.  Has snacks of Cheerios with milk and uses sweet and low.  Also enjoys sugar-free ice cream with beer.  Lives on the second floor and walks the stairs to help with exercise.  Previously on glimepiride and metformin but has not been taking.  Denies excessive thirst, increased frequency in urination, excessive sweating, confusion, lightheadedness.        Objective   Vital Signs:  /76 (BP Location: Left arm, Patient Position: Sitting, Cuff Size: Adult)   Pulse 81   Temp 98.2 °F (36.8 °C) (Oral)   Ht 152.4 cm (60\")   Wt 63.9 kg (140 lb 12.8 oz)   SpO2 96%   BMI 27.50 kg/m²   Estimated body mass index is 27.5 kg/m² as calculated from the following:    Height as of this encounter: 152.4 cm (60\").    Weight as of this encounter: 63.9 kg (140 lb 12.8 oz).             Physical Exam  Vitals reviewed.   Constitutional:       Appearance: Normal appearance.   HENT:      Head: Normocephalic.   Musculoskeletal:         General: Normal range of motion.      Cervical back: Normal range of motion.   Skin:     General: Skin is warm and dry.      Capillary Refill: Capillary refill takes less than 2 seconds.   Neurological:      General: No focal deficit present.      Mental Status: She is alert and oriented to person, place, and time.   Psychiatric:         Mood and Affect: Mood normal.         Behavior: Behavior normal.         Thought Content: Thought content normal.         Judgment: Judgment normal.        Result Review :    Common labs    Common Labs 12/20/22 12/20/22 3/23/23 3/23/23    1533 1533 1439 1439   Glucose    362 (A)   BUN    13   Creatinine    0.72   Sodium    136   Potassium    5.0   Chloride    99   Calcium   "  10.0   Total Protein    7.1   Albumin    4.4   Total Bilirubin    0.5   Alkaline Phosphatase    94   AST (SGOT)    18   ALT (SGPT)    15   Total Cholesterol  276 (A)     Triglycerides  215 (A)     HDL Cholesterol  60     LDL Cholesterol   176 (A)     Hemoglobin A1C 11.80 (A)  13.40 (A)    (A) Abnormal value       Comments are available for some flowsheets but are not being displayed.           Current Outpatient Medications on File Prior to Visit   Medication Sig Dispense Refill   • Blood Glucose Monitoring Suppl (Accu-Chek Mary Ann Plus) w/Device kit 1 device (Patient not taking: Reported on 3/23/2023) 1 kit 0   • Continuous Blood Gluc  (FreeStyle Arpita 2 Ray) device 1 Device Daily. (Patient not taking: Reported on 3/23/2023) 2 each 3   • Continuous Blood Gluc Sensor (FreeStyle Arpita 2 Sensor) misc 1 Device Every 14 (Fourteen) Days. (Patient not taking: Reported on 3/23/2023) 2 each 3   • glimepiride (AMARYL) 2 MG tablet Take 1 tablet by mouth 2 (Two) Times a Day With Meals. (Patient not taking: Reported on 3/23/2023) 30 tablet 11   • glucose blood (Accu-Chek Mary Ann Plus) test strip To check 3 - 4 times a day (Patient not taking: Reported on 3/23/2023) 100 each 2   • ibuprofen (ADVIL,MOTRIN) 600 MG tablet Take 600 mg by mouth. 2 before bed (Patient not taking: Reported on 3/23/2023)     • Lancets (accu-chek multiclix) lancets To check 3 - 4 times a day (Patient not taking: Reported on 3/23/2023) 100 each 2   • metFORMIN ER (GLUCOPHAGE-XR) 500 MG 24 hr tablet Take 1 tablet by mouth 2 (Two) Times a Day With Meals. (Patient not taking: Reported on 3/23/2023) 60 tablet 11   • rosuvastatin (CRESTOR) 5 MG tablet Take 1 tablet by mouth Daily. 90 tablet 3     No current facility-administered medications on file prior to visit.                    Assessment and Plan   Diagnoses and all orders for this visit:    1. Type 2 diabetes mellitus with hyperglycemia, without long-term current use of insulin (HCC)  (Primary)  -     Hemoglobin A1c  -     Urinalysis With Microscopic If Indicated (No Culture) - Urine, Clean Catch  -     Comprehensive Metabolic Panel      Patient Instructions   Call endocrinology to setup appointment to discuss blood sugars and mediations. DWAINE Cuevas endocrinology [phone]. Keep staying active. Encourage healthy snacks. Stay hydrated with water. Labs today. Follow-up in 4 months for recheck and labs.            Follow Up   No follow-ups on file.  Patient was given instructions and counseling regarding her condition or for health maintenance advice. Please see specific information pulled into the AVS if appropriate.

## 2023-03-23 NOTE — PATIENT INSTRUCTIONS
Call endocrinology to setup appointment to discuss blood sugars and mediations. Diana Carroll, APRN endocrinology [phone]. Keep staying active. Encourage healthy snacks. Stay hydrated with water. Labs today. Follow-up in 4 months for recheck and labs.

## 2023-03-24 LAB
ALBUMIN SERPL-MCNC: 4.4 G/DL (ref 3.5–5.2)
ALBUMIN/GLOB SERPL: 1.6 G/DL
ALP SERPL-CCNC: 94 U/L (ref 39–117)
ALT SERPL-CCNC: 15 U/L (ref 1–33)
APPEARANCE UR: ABNORMAL
AST SERPL-CCNC: 18 U/L (ref 1–32)
BACTERIA #/AREA URNS HPF: ABNORMAL /HPF
BILIRUB SERPL-MCNC: 0.5 MG/DL (ref 0–1.2)
BILIRUB UR QL STRIP: NEGATIVE
BUN SERPL-MCNC: 13 MG/DL (ref 8–23)
BUN/CREAT SERPL: 18.1 (ref 7–25)
CALCIUM SERPL-MCNC: 10 MG/DL (ref 8.6–10.5)
CASTS URNS MICRO: ABNORMAL
CHLORIDE SERPL-SCNC: 99 MMOL/L (ref 98–107)
CO2 SERPL-SCNC: 25.3 MMOL/L (ref 22–29)
COLOR UR: YELLOW
CREAT SERPL-MCNC: 0.72 MG/DL (ref 0.57–1)
EGFRCR SERPLBLD CKD-EPI 2021: 84.6 ML/MIN/1.73
EPI CELLS #/AREA URNS HPF: ABNORMAL /HPF
GLOBULIN SER CALC-MCNC: 2.7 GM/DL
GLUCOSE SERPL-MCNC: 362 MG/DL (ref 65–99)
GLUCOSE UR QL STRIP: ABNORMAL
HBA1C MFR BLD: 13.4 % (ref 4.8–5.6)
HGB UR QL STRIP: ABNORMAL
KETONES UR QL STRIP: ABNORMAL
LEUKOCYTE ESTERASE UR QL STRIP: ABNORMAL
NITRITE UR QL STRIP: POSITIVE
PH UR STRIP: 6 [PH] (ref 5–8)
POTASSIUM SERPL-SCNC: 5 MMOL/L (ref 3.5–5.2)
PROT SERPL-MCNC: 7.1 G/DL (ref 6–8.5)
PROT UR QL STRIP: ABNORMAL
RBC #/AREA URNS HPF: ABNORMAL /HPF
SODIUM SERPL-SCNC: 136 MMOL/L (ref 136–145)
SP GR UR STRIP: ABNORMAL (ref 1–1.03)
UROBILINOGEN UR STRIP-MCNC: ABNORMAL MG/DL
WBC #/AREA URNS HPF: ABNORMAL /HPF

## 2023-03-27 NOTE — PROGRESS NOTES
Metabolic panel shows elevated blood sugar of 362 and a nonfasting lab correlating with a hemoglobin A1c of 13.4.  Urinalysis shows large amounts of glucose and trace amounts of ketones present which correlates with uncontrolled diabetes.  Managing diabetes with diet and exercise alone is not enough.  Need to consistently take prescribed medication to manage diabetes.  Take glimepiride 2 mg twice a day with meals.  Take metformin 500 mg 2 times a day with meals.  Limit intake of sugar and carbohydrates.  We will recheck labs at next visit.

## 2023-03-29 PROBLEM — G62.9 NEUROPATHY: Status: ACTIVE | Noted: 2023-03-29
